# Patient Record
Sex: FEMALE | Race: WHITE | NOT HISPANIC OR LATINO | Employment: OTHER | URBAN - NONMETROPOLITAN AREA
[De-identification: names, ages, dates, MRNs, and addresses within clinical notes are randomized per-mention and may not be internally consistent; named-entity substitution may affect disease eponyms.]

---

## 2021-03-11 ENCOUNTER — HOSPITAL ENCOUNTER (OUTPATIENT)
Dept: LAB | Facility: MEDICAL CENTER | Age: 81
End: 2021-03-11
Attending: INTERNAL MEDICINE
Payer: MEDICARE

## 2021-03-11 PROCEDURE — 82306 VITAMIN D 25 HYDROXY: CPT

## 2021-03-11 PROCEDURE — 84443 ASSAY THYROID STIM HORMONE: CPT

## 2021-03-11 PROCEDURE — 84439 ASSAY OF FREE THYROXINE: CPT

## 2021-03-11 PROCEDURE — 85025 COMPLETE CBC W/AUTO DIFF WBC: CPT

## 2021-03-11 PROCEDURE — 80053 COMPREHEN METABOLIC PANEL: CPT

## 2021-03-11 PROCEDURE — 82043 UR ALBUMIN QUANTITATIVE: CPT

## 2021-03-11 PROCEDURE — 36415 COLL VENOUS BLD VENIPUNCTURE: CPT

## 2021-03-11 PROCEDURE — 82570 ASSAY OF URINE CREATININE: CPT

## 2021-03-11 PROCEDURE — 80061 LIPID PANEL: CPT

## 2021-03-12 LAB
25(OH)D3 SERPL-MCNC: 77 NG/ML (ref 30–100)
ALBUMIN SERPL BCP-MCNC: 4 G/DL (ref 3.2–4.9)
ALBUMIN/GLOB SERPL: 1.4 G/DL
ALP SERPL-CCNC: 124 U/L (ref 30–99)
ALT SERPL-CCNC: 10 U/L (ref 2–50)
ANION GAP SERPL CALC-SCNC: 10 MMOL/L (ref 7–16)
ANISOCYTOSIS BLD QL SMEAR: ABNORMAL
AST SERPL-CCNC: 19 U/L (ref 12–45)
BASOPHILS # BLD AUTO: 1 % (ref 0–1.8)
BASOPHILS # BLD: 0.07 K/UL (ref 0–0.12)
BILIRUB SERPL-MCNC: 0.4 MG/DL (ref 0.1–1.5)
BUN SERPL-MCNC: 13 MG/DL (ref 8–22)
CALCIUM SERPL-MCNC: 9.3 MG/DL (ref 8.5–10.5)
CHLORIDE SERPL-SCNC: 103 MMOL/L (ref 96–112)
CHOLEST SERPL-MCNC: 158 MG/DL (ref 100–199)
CO2 SERPL-SCNC: 26 MMOL/L (ref 20–33)
COMMENT 1642: NORMAL
CREAT SERPL-MCNC: 0.77 MG/DL (ref 0.5–1.4)
CREAT UR-MCNC: 83.9 MG/DL
EOSINOPHIL # BLD AUTO: 0.14 K/UL (ref 0–0.51)
EOSINOPHIL NFR BLD: 1.9 % (ref 0–6.9)
ERYTHROCYTE [DISTWIDTH] IN BLOOD BY AUTOMATED COUNT: 54.3 FL (ref 35.9–50)
FASTING STATUS PATIENT QL REPORTED: NORMAL
GLOBULIN SER CALC-MCNC: 2.9 G/DL (ref 1.9–3.5)
GLUCOSE SERPL-MCNC: 125 MG/DL (ref 65–99)
HCT VFR BLD AUTO: 35.2 % (ref 37–47)
HDLC SERPL-MCNC: 61 MG/DL
HGB BLD-MCNC: 10.5 G/DL (ref 12–16)
IMM GRANULOCYTES # BLD AUTO: 0.04 K/UL (ref 0–0.11)
IMM GRANULOCYTES NFR BLD AUTO: 0.5 % (ref 0–0.9)
LDLC SERPL CALC-MCNC: 66 MG/DL
LYMPHOCYTES # BLD AUTO: 1.26 K/UL (ref 1–4.8)
LYMPHOCYTES NFR BLD: 17.2 % (ref 22–41)
MCH RBC QN AUTO: 24.1 PG (ref 27–33)
MCHC RBC AUTO-ENTMCNC: 29.8 G/DL (ref 33.6–35)
MCV RBC AUTO: 80.7 FL (ref 81.4–97.8)
MICROALBUMIN UR-MCNC: <1.2 MG/DL
MICROALBUMIN/CREAT UR: NORMAL MG/G (ref 0–30)
MICROCYTES BLD QL SMEAR: ABNORMAL
MONOCYTES # BLD AUTO: 0.58 K/UL (ref 0–0.85)
MONOCYTES NFR BLD AUTO: 7.9 % (ref 0–13.4)
MORPHOLOGY BLD-IMP: NORMAL
NEUTROPHILS # BLD AUTO: 5.22 K/UL (ref 2–7.15)
NEUTROPHILS NFR BLD: 71.5 % (ref 44–72)
NRBC # BLD AUTO: 0 K/UL
NRBC BLD-RTO: 0 /100 WBC
OVALOCYTES BLD QL SMEAR: NORMAL
PLATELET # BLD AUTO: 287 K/UL (ref 164–446)
PLATELET BLD QL SMEAR: NORMAL
PMV BLD AUTO: 10.8 FL (ref 9–12.9)
POIKILOCYTOSIS BLD QL SMEAR: NORMAL
POLYCHROMASIA BLD QL SMEAR: NORMAL
POTASSIUM SERPL-SCNC: 4.1 MMOL/L (ref 3.6–5.5)
PROT SERPL-MCNC: 6.9 G/DL (ref 6–8.2)
RBC # BLD AUTO: 4.36 M/UL (ref 4.2–5.4)
RBC BLD AUTO: PRESENT
SODIUM SERPL-SCNC: 139 MMOL/L (ref 135–145)
T4 FREE SERPL-MCNC: 0.91 NG/DL (ref 0.93–1.7)
TRIGL SERPL-MCNC: 157 MG/DL (ref 0–149)
TSH SERPL DL<=0.005 MIU/L-ACNC: 2.16 UIU/ML (ref 0.38–5.33)
WBC # BLD AUTO: 7.3 K/UL (ref 4.8–10.8)

## 2025-06-08 ENCOUNTER — HOSPITAL ENCOUNTER (INPATIENT)
Facility: MEDICAL CENTER | Age: 85
LOS: 3 days | DRG: 482 | End: 2025-06-11
Attending: EMERGENCY MEDICINE | Admitting: STUDENT IN AN ORGANIZED HEALTH CARE EDUCATION/TRAINING PROGRAM
Payer: MEDICARE

## 2025-06-08 ENCOUNTER — APPOINTMENT (OUTPATIENT)
Dept: RADIOLOGY | Facility: MEDICAL CENTER | Age: 85
DRG: 482 | End: 2025-06-08
Attending: EMERGENCY MEDICINE
Payer: MEDICARE

## 2025-06-08 ENCOUNTER — APPOINTMENT (OUTPATIENT)
Dept: RADIOLOGY | Facility: MEDICAL CENTER | Age: 85
DRG: 482 | End: 2025-06-08
Attending: ORTHOPAEDIC SURGERY
Payer: MEDICARE

## 2025-06-08 DIAGNOSIS — Z87.81 HISTORY OF FEMUR FRACTURE: ICD-10-CM

## 2025-06-08 DIAGNOSIS — S72.002A CLOSED FRACTURE OF LEFT HIP, INITIAL ENCOUNTER (HCC): Primary | ICD-10-CM

## 2025-06-08 DIAGNOSIS — Z01.818 ENCOUNTER FOR PREOPERATIVE ASSESSMENT: ICD-10-CM

## 2025-06-08 PROBLEM — E78.5 HYPERLIPIDEMIA: Status: ACTIVE | Noted: 2025-06-08

## 2025-06-08 PROBLEM — S72.90XA FEMUR FRACTURE (HCC): Status: ACTIVE | Noted: 2025-06-08

## 2025-06-08 PROBLEM — I10 HYPERTENSION: Status: ACTIVE | Noted: 2025-06-08

## 2025-06-08 PROBLEM — Z71.89 ACP (ADVANCE CARE PLANNING): Status: ACTIVE | Noted: 2025-06-08

## 2025-06-08 LAB
ALBUMIN SERPL BCP-MCNC: 3.9 G/DL (ref 3.2–4.9)
ALBUMIN/GLOB SERPL: 1.4 G/DL
ALP SERPL-CCNC: 144 U/L (ref 30–99)
ALT SERPL-CCNC: 16 U/L (ref 2–50)
ANION GAP SERPL CALC-SCNC: 10 MMOL/L (ref 7–16)
AST SERPL-CCNC: 26 U/L (ref 12–45)
BASOPHILS # BLD AUTO: 0.6 % (ref 0–1.8)
BASOPHILS # BLD: 0.07 K/UL (ref 0–0.12)
BILIRUB SERPL-MCNC: 0.3 MG/DL (ref 0.1–1.5)
BUN SERPL-MCNC: 17 MG/DL (ref 8–22)
CALCIUM ALBUM COR SERPL-MCNC: 9.2 MG/DL (ref 8.5–10.5)
CALCIUM SERPL-MCNC: 9.1 MG/DL (ref 8.5–10.5)
CHLORIDE SERPL-SCNC: 107 MMOL/L (ref 96–112)
CO2 SERPL-SCNC: 22 MMOL/L (ref 20–33)
CREAT SERPL-MCNC: 0.9 MG/DL (ref 0.5–1.4)
EKG IMPRESSION: NORMAL
EOSINOPHIL # BLD AUTO: 0.1 K/UL (ref 0–0.51)
EOSINOPHIL NFR BLD: 0.8 % (ref 0–6.9)
ERYTHROCYTE [DISTWIDTH] IN BLOOD BY AUTOMATED COUNT: 45.3 FL (ref 35.9–50)
GFR SERPLBLD CREATININE-BSD FMLA CKD-EPI: 63 ML/MIN/1.73 M 2
GLOBULIN SER CALC-MCNC: 2.8 G/DL (ref 1.9–3.5)
GLUCOSE SERPL-MCNC: 110 MG/DL (ref 65–99)
HCT VFR BLD AUTO: 35.8 % (ref 37–47)
HGB BLD-MCNC: 11.3 G/DL (ref 12–16)
IMM GRANULOCYTES # BLD AUTO: 0.05 K/UL (ref 0–0.11)
IMM GRANULOCYTES NFR BLD AUTO: 0.4 % (ref 0–0.9)
LYMPHOCYTES # BLD AUTO: 0.91 K/UL (ref 1–4.8)
LYMPHOCYTES NFR BLD: 7.6 % (ref 22–41)
MCH RBC QN AUTO: 28.2 PG (ref 27–33)
MCHC RBC AUTO-ENTMCNC: 31.6 G/DL (ref 32.2–35.5)
MCV RBC AUTO: 89.3 FL (ref 81.4–97.8)
MONOCYTES # BLD AUTO: 0.64 K/UL (ref 0–0.85)
MONOCYTES NFR BLD AUTO: 5.3 % (ref 0–13.4)
NEUTROPHILS # BLD AUTO: 10.27 K/UL (ref 1.82–7.42)
NEUTROPHILS NFR BLD: 85.3 % (ref 44–72)
NRBC # BLD AUTO: 0 K/UL
NRBC BLD-RTO: 0 /100 WBC (ref 0–0.2)
PLATELET # BLD AUTO: 203 K/UL (ref 164–446)
PMV BLD AUTO: 10.3 FL (ref 9–12.9)
POTASSIUM SERPL-SCNC: 4.1 MMOL/L (ref 3.6–5.5)
PROT SERPL-MCNC: 6.7 G/DL (ref 6–8.2)
RBC # BLD AUTO: 4.01 M/UL (ref 4.2–5.4)
SODIUM SERPL-SCNC: 139 MMOL/L (ref 135–145)
WBC # BLD AUTO: 12 K/UL (ref 4.8–10.8)

## 2025-06-08 PROCEDURE — 99285 EMERGENCY DEPT VISIT HI MDM: CPT

## 2025-06-08 PROCEDURE — 73502 X-RAY EXAM HIP UNI 2-3 VIEWS: CPT | Mod: LT

## 2025-06-08 PROCEDURE — 80053 COMPREHEN METABOLIC PANEL: CPT

## 2025-06-08 PROCEDURE — 73552 X-RAY EXAM OF FEMUR 2/>: CPT | Mod: LT

## 2025-06-08 PROCEDURE — 93005 ELECTROCARDIOGRAM TRACING: CPT | Mod: TC | Performed by: STUDENT IN AN ORGANIZED HEALTH CARE EDUCATION/TRAINING PROGRAM

## 2025-06-08 PROCEDURE — 36415 COLL VENOUS BLD VENIPUNCTURE: CPT

## 2025-06-08 PROCEDURE — 99223 1ST HOSP IP/OBS HIGH 75: CPT | Mod: AI | Performed by: STUDENT IN AN ORGANIZED HEALTH CARE EDUCATION/TRAINING PROGRAM

## 2025-06-08 PROCEDURE — 700102 HCHG RX REV CODE 250 W/ 637 OVERRIDE(OP): Performed by: STUDENT IN AN ORGANIZED HEALTH CARE EDUCATION/TRAINING PROGRAM

## 2025-06-08 PROCEDURE — 64447 NJX AA&/STRD FEMORAL NRV IMG: CPT

## 2025-06-08 PROCEDURE — 770001 HCHG ROOM/CARE - MED/SURG/GYN PRIV*

## 2025-06-08 PROCEDURE — A9270 NON-COVERED ITEM OR SERVICE: HCPCS | Performed by: STUDENT IN AN ORGANIZED HEALTH CARE EDUCATION/TRAINING PROGRAM

## 2025-06-08 PROCEDURE — 71045 X-RAY EXAM CHEST 1 VIEW: CPT

## 2025-06-08 PROCEDURE — 96374 THER/PROPH/DIAG INJ IV PUSH: CPT

## 2025-06-08 PROCEDURE — 99497 ADVNCD CARE PLAN 30 MIN: CPT | Mod: 25 | Performed by: STUDENT IN AN ORGANIZED HEALTH CARE EDUCATION/TRAINING PROGRAM

## 2025-06-08 PROCEDURE — 700111 HCHG RX REV CODE 636 W/ 250 OVERRIDE (IP): Performed by: EMERGENCY MEDICINE

## 2025-06-08 PROCEDURE — 700105 HCHG RX REV CODE 258: Performed by: STUDENT IN AN ORGANIZED HEALTH CARE EDUCATION/TRAINING PROGRAM

## 2025-06-08 PROCEDURE — 85025 COMPLETE CBC W/AUTO DIFF WBC: CPT

## 2025-06-08 RX ORDER — DEXTROSE MONOHYDRATE 25 G/50ML
25 INJECTION, SOLUTION INTRAVENOUS
Status: DISCONTINUED | OUTPATIENT
Start: 2025-06-08 | End: 2025-06-09

## 2025-06-08 RX ORDER — ONDANSETRON 4 MG/1
4 TABLET, ORALLY DISINTEGRATING ORAL EVERY 4 HOURS PRN
Status: DISCONTINUED | OUTPATIENT
Start: 2025-06-08 | End: 2025-06-11 | Stop reason: HOSPADM

## 2025-06-08 RX ORDER — AMLODIPINE BESYLATE 5 MG/1
5 TABLET ORAL EVERY EVENING
Status: DISCONTINUED | OUTPATIENT
Start: 2025-06-08 | End: 2025-06-11 | Stop reason: HOSPADM

## 2025-06-08 RX ORDER — AMLODIPINE BESYLATE 5 MG/1
5 TABLET ORAL EVERY EVENING
COMMUNITY

## 2025-06-08 RX ORDER — MELOXICAM 15 MG/1
15 TABLET ORAL EVERY EVENING
Status: ON HOLD | COMMUNITY
End: 2025-06-11

## 2025-06-08 RX ORDER — SODIUM CHLORIDE 9 MG/ML
INJECTION, SOLUTION INTRAVENOUS CONTINUOUS
Status: DISCONTINUED | OUTPATIENT
Start: 2025-06-09 | End: 2025-06-10

## 2025-06-08 RX ORDER — OXYCODONE HYDROCHLORIDE 5 MG/1
2.5 TABLET ORAL
Refills: 0 | Status: DISCONTINUED | OUTPATIENT
Start: 2025-06-08 | End: 2025-06-11 | Stop reason: HOSPADM

## 2025-06-08 RX ORDER — MORPHINE SULFATE 4 MG/ML
4 INJECTION INTRAVENOUS ONCE
Status: ACTIVE | OUTPATIENT
Start: 2025-06-08 | End: 2025-06-09

## 2025-06-08 RX ORDER — HYDROMORPHONE HYDROCHLORIDE 1 MG/ML
0.25 INJECTION, SOLUTION INTRAMUSCULAR; INTRAVENOUS; SUBCUTANEOUS
Status: DISCONTINUED | OUTPATIENT
Start: 2025-06-08 | End: 2025-06-11 | Stop reason: HOSPADM

## 2025-06-08 RX ORDER — TRAZODONE HYDROCHLORIDE 50 MG/1
50 TABLET ORAL
Status: DISCONTINUED | OUTPATIENT
Start: 2025-06-08 | End: 2025-06-11 | Stop reason: HOSPADM

## 2025-06-08 RX ORDER — LABETALOL HYDROCHLORIDE 5 MG/ML
10 INJECTION, SOLUTION INTRAVENOUS EVERY 4 HOURS PRN
Status: DISCONTINUED | OUTPATIENT
Start: 2025-06-08 | End: 2025-06-11 | Stop reason: HOSPADM

## 2025-06-08 RX ORDER — TRAZODONE HYDROCHLORIDE 50 MG/1
50 TABLET ORAL
COMMUNITY

## 2025-06-08 RX ORDER — EMPAGLIFLOZIN 25 MG/1
25 TABLET, FILM COATED ORAL EVERY EVENING
COMMUNITY

## 2025-06-08 RX ORDER — ACETAMINOPHEN 325 MG/1
650 TABLET ORAL EVERY 6 HOURS PRN
Status: DISCONTINUED | OUTPATIENT
Start: 2025-06-08 | End: 2025-06-11 | Stop reason: HOSPADM

## 2025-06-08 RX ORDER — INSULIN LISPRO 100 [IU]/ML
1-6 INJECTION, SOLUTION INTRAVENOUS; SUBCUTANEOUS EVERY 6 HOURS
Status: DISCONTINUED | OUTPATIENT
Start: 2025-06-09 | End: 2025-06-09

## 2025-06-08 RX ORDER — ONDANSETRON 2 MG/ML
4 INJECTION INTRAMUSCULAR; INTRAVENOUS EVERY 4 HOURS PRN
Status: DISCONTINUED | OUTPATIENT
Start: 2025-06-08 | End: 2025-06-11 | Stop reason: HOSPADM

## 2025-06-08 RX ORDER — ATORVASTATIN CALCIUM 20 MG/1
20 TABLET, FILM COATED ORAL NIGHTLY
Status: DISCONTINUED | OUTPATIENT
Start: 2025-06-08 | End: 2025-06-11 | Stop reason: HOSPADM

## 2025-06-08 RX ORDER — OXYCODONE HYDROCHLORIDE 5 MG/1
5 TABLET ORAL
Refills: 0 | Status: DISCONTINUED | OUTPATIENT
Start: 2025-06-08 | End: 2025-06-11 | Stop reason: HOSPADM

## 2025-06-08 RX ORDER — DULOXETIN HYDROCHLORIDE 30 MG/1
30 CAPSULE, DELAYED RELEASE ORAL EVERY EVENING
Status: DISCONTINUED | OUTPATIENT
Start: 2025-06-08 | End: 2025-06-11 | Stop reason: HOSPADM

## 2025-06-08 RX ORDER — ROPIVACAINE HYDROCHLORIDE 2 MG/ML
40 INJECTION, SOLUTION EPIDURAL; INFILTRATION; PERINEURAL ONCE
Status: COMPLETED | OUTPATIENT
Start: 2025-06-08 | End: 2025-06-08

## 2025-06-08 RX ADMIN — ROPIVACAINE HYDROCHLORIDE 40 ML: 2 INJECTION, SOLUTION EPIDURAL; INFILTRATION at 20:30

## 2025-06-08 RX ADMIN — SODIUM CHLORIDE: 9 INJECTION, SOLUTION INTRAVENOUS at 22:48

## 2025-06-08 RX ADMIN — AMLODIPINE BESYLATE 5 MG: 5 TABLET ORAL at 22:39

## 2025-06-08 RX ADMIN — ATORVASTATIN CALCIUM 20 MG: 20 TABLET, FILM COATED ORAL at 22:40

## 2025-06-08 RX ADMIN — DULOXETINE 30 MG: 30 CAPSULE, DELAYED RELEASE ORAL at 22:39

## 2025-06-08 ASSESSMENT — COGNITIVE AND FUNCTIONAL STATUS - GENERAL
MOVING FROM LYING ON BACK TO SITTING ON SIDE OF FLAT BED: TOTAL
MOBILITY SCORE: 6
SUGGESTED CMS G CODE MODIFIER MOBILITY: CN
SUGGESTED CMS G CODE MODIFIER DAILY ACTIVITY: CK
HELP NEEDED FOR BATHING: TOTAL
MOVING TO AND FROM BED TO CHAIR: TOTAL
DRESSING REGULAR LOWER BODY CLOTHING: TOTAL
TURNING FROM BACK TO SIDE WHILE IN FLAT BAD: TOTAL
DAILY ACTIVITIY SCORE: 15
WALKING IN HOSPITAL ROOM: TOTAL
CLIMB 3 TO 5 STEPS WITH RAILING: TOTAL
TOILETING: TOTAL
STANDING UP FROM CHAIR USING ARMS: TOTAL

## 2025-06-08 ASSESSMENT — ENCOUNTER SYMPTOMS
PSYCHIATRIC NEGATIVE: 1
CARDIOVASCULAR NEGATIVE: 1
NEUROLOGICAL NEGATIVE: 1
GASTROINTESTINAL NEGATIVE: 1
FALLS: 1
RESPIRATORY NEGATIVE: 1
EYES NEGATIVE: 1

## 2025-06-08 ASSESSMENT — PAIN DESCRIPTION - PAIN TYPE: TYPE: ACUTE PAIN

## 2025-06-09 ENCOUNTER — HOSPITAL ENCOUNTER (OUTPATIENT)
Dept: RADIOLOGY | Facility: MEDICAL CENTER | Age: 85
End: 2025-06-09
Attending: EMERGENCY MEDICINE
Payer: MEDICARE

## 2025-06-09 ENCOUNTER — APPOINTMENT (OUTPATIENT)
Dept: RADIOLOGY | Facility: MEDICAL CENTER | Age: 85
DRG: 482 | End: 2025-06-09
Attending: ORTHOPAEDIC SURGERY
Payer: MEDICARE

## 2025-06-09 ENCOUNTER — ANESTHESIA EVENT (OUTPATIENT)
Dept: SURGERY | Facility: MEDICAL CENTER | Age: 85
DRG: 482 | End: 2025-06-09
Payer: MEDICARE

## 2025-06-09 ENCOUNTER — SURGERY (OUTPATIENT)
Age: 85
End: 2025-06-09
Payer: MEDICARE

## 2025-06-09 ENCOUNTER — ANESTHESIA (OUTPATIENT)
Dept: SURGERY | Facility: MEDICAL CENTER | Age: 85
DRG: 482 | End: 2025-06-09
Payer: MEDICARE

## 2025-06-09 LAB
ANION GAP SERPL CALC-SCNC: 12 MMOL/L (ref 7–16)
BUN SERPL-MCNC: 15 MG/DL (ref 8–22)
CALCIUM SERPL-MCNC: 8.8 MG/DL (ref 8.5–10.5)
CHLORIDE SERPL-SCNC: 106 MMOL/L (ref 96–112)
CO2 SERPL-SCNC: 21 MMOL/L (ref 20–33)
CREAT SERPL-MCNC: 0.86 MG/DL (ref 0.5–1.4)
ERYTHROCYTE [DISTWIDTH] IN BLOOD BY AUTOMATED COUNT: 45.1 FL (ref 35.9–50)
GFR SERPLBLD CREATININE-BSD FMLA CKD-EPI: 67 ML/MIN/1.73 M 2
GLUCOSE BLD STRIP.AUTO-MCNC: 131 MG/DL (ref 65–99)
GLUCOSE BLD STRIP.AUTO-MCNC: 151 MG/DL (ref 65–99)
GLUCOSE BLD STRIP.AUTO-MCNC: 170 MG/DL (ref 65–99)
GLUCOSE BLD STRIP.AUTO-MCNC: 188 MG/DL (ref 65–99)
GLUCOSE BLD STRIP.AUTO-MCNC: 199 MG/DL (ref 65–99)
GLUCOSE SERPL-MCNC: 159 MG/DL (ref 65–99)
HCT VFR BLD AUTO: 35 % (ref 37–47)
HGB BLD-MCNC: 11.2 G/DL (ref 12–16)
MCH RBC QN AUTO: 28.4 PG (ref 27–33)
MCHC RBC AUTO-ENTMCNC: 32 G/DL (ref 32.2–35.5)
MCV RBC AUTO: 88.6 FL (ref 81.4–97.8)
PLATELET # BLD AUTO: 187 K/UL (ref 164–446)
PMV BLD AUTO: 10.8 FL (ref 9–12.9)
POTASSIUM SERPL-SCNC: 3.7 MMOL/L (ref 3.6–5.5)
RBC # BLD AUTO: 3.95 M/UL (ref 4.2–5.4)
SODIUM SERPL-SCNC: 139 MMOL/L (ref 135–145)
WBC # BLD AUTO: 10.2 K/UL (ref 4.8–10.8)

## 2025-06-09 PROCEDURE — 85027 COMPLETE CBC AUTOMATED: CPT

## 2025-06-09 PROCEDURE — 80048 BASIC METABOLIC PNL TOTAL CA: CPT

## 2025-06-09 PROCEDURE — 700111 HCHG RX REV CODE 636 W/ 250 OVERRIDE (IP): Performed by: STUDENT IN AN ORGANIZED HEALTH CARE EDUCATION/TRAINING PROGRAM

## 2025-06-09 PROCEDURE — 73502 X-RAY EXAM HIP UNI 2-3 VIEWS: CPT | Mod: LT

## 2025-06-09 PROCEDURE — 160015 HCHG STAT PREOP MINUTES: Performed by: ORTHOPAEDIC SURGERY

## 2025-06-09 PROCEDURE — C1713 ANCHOR/SCREW BN/BN,TIS/BN: HCPCS | Performed by: ORTHOPAEDIC SURGERY

## 2025-06-09 PROCEDURE — A9270 NON-COVERED ITEM OR SERVICE: HCPCS | Performed by: STUDENT IN AN ORGANIZED HEALTH CARE EDUCATION/TRAINING PROGRAM

## 2025-06-09 PROCEDURE — 99232 SBSQ HOSP IP/OBS MODERATE 35: CPT | Performed by: STUDENT IN AN ORGANIZED HEALTH CARE EDUCATION/TRAINING PROGRAM

## 2025-06-09 PROCEDURE — 160048 HCHG OR STATISTICAL LEVEL 1-5: Performed by: ORTHOPAEDIC SURGERY

## 2025-06-09 PROCEDURE — 770001 HCHG ROOM/CARE - MED/SURG/GYN PRIV*

## 2025-06-09 PROCEDURE — 700111 HCHG RX REV CODE 636 W/ 250 OVERRIDE (IP): Mod: JZ | Performed by: STUDENT IN AN ORGANIZED HEALTH CARE EDUCATION/TRAINING PROGRAM

## 2025-06-09 PROCEDURE — 27245 TREAT THIGH FRACTURE: CPT | Mod: 80ROC,LT | Performed by: STUDENT IN AN ORGANIZED HEALTH CARE EDUCATION/TRAINING PROGRAM

## 2025-06-09 PROCEDURE — 700102 HCHG RX REV CODE 250 W/ 637 OVERRIDE(OP): Performed by: STUDENT IN AN ORGANIZED HEALTH CARE EDUCATION/TRAINING PROGRAM

## 2025-06-09 PROCEDURE — 0QS704Z REPOSITION LEFT UPPER FEMUR WITH INTERNAL FIXATION DEVICE, OPEN APPROACH: ICD-10-PCS | Performed by: ORTHOPAEDIC SURGERY

## 2025-06-09 PROCEDURE — 160029 HCHG SURGERY MINUTES - 1ST 30 MINS LEVEL 4: Performed by: ORTHOPAEDIC SURGERY

## 2025-06-09 PROCEDURE — 73700 CT LOWER EXTREMITY W/O DYE: CPT | Mod: LT

## 2025-06-09 PROCEDURE — 160035 HCHG PACU - 1ST 60 MINS PHASE I: Performed by: ORTHOPAEDIC SURGERY

## 2025-06-09 PROCEDURE — 700111 HCHG RX REV CODE 636 W/ 250 OVERRIDE (IP): Mod: JZ,TB | Performed by: ORTHOPAEDIC SURGERY

## 2025-06-09 PROCEDURE — 700101 HCHG RX REV CODE 250: Performed by: STUDENT IN AN ORGANIZED HEALTH CARE EDUCATION/TRAINING PROGRAM

## 2025-06-09 PROCEDURE — 99223 1ST HOSP IP/OBS HIGH 75: CPT | Mod: 57 | Performed by: ORTHOPAEDIC SURGERY

## 2025-06-09 PROCEDURE — 160002 HCHG RECOVERY MINUTES (STAT): Performed by: ORTHOPAEDIC SURGERY

## 2025-06-09 PROCEDURE — 160041 HCHG SURGERY MINUTES - EA ADDL 1 MIN LEVEL 4: Performed by: ORTHOPAEDIC SURGERY

## 2025-06-09 PROCEDURE — 160009 HCHG ANES TIME/MIN: Performed by: ORTHOPAEDIC SURGERY

## 2025-06-09 PROCEDURE — 82962 GLUCOSE BLOOD TEST: CPT | Mod: 91

## 2025-06-09 PROCEDURE — 700105 HCHG RX REV CODE 258: Performed by: STUDENT IN AN ORGANIZED HEALTH CARE EDUCATION/TRAINING PROGRAM

## 2025-06-09 PROCEDURE — 700105 HCHG RX REV CODE 258: Performed by: ORTHOPAEDIC SURGERY

## 2025-06-09 PROCEDURE — 110371 HCHG SHELL REV 272: Performed by: ORTHOPAEDIC SURGERY

## 2025-06-09 PROCEDURE — 27245 TREAT THIGH FRACTURE: CPT | Mod: LT | Performed by: ORTHOPAEDIC SURGERY

## 2025-06-09 PROCEDURE — 36415 COLL VENOUS BLD VENIPUNCTURE: CPT

## 2025-06-09 DEVICE — LOCKING SCREW DIA 5X35MM: Type: IMPLANTABLE DEVICE | Site: FEMUR | Status: FUNCTIONAL

## 2025-06-09 DEVICE — NAIL THOCHANTERIC 125DEG 10MM X 170MM (1EA): Type: IMPLANTABLE DEVICE | Site: FEMUR | Status: FUNCTIONAL

## 2025-06-09 DEVICE — IMPLANTABLE DEVICE: Type: IMPLANTABLE DEVICE | Site: FEMUR | Status: FUNCTIONAL

## 2025-06-09 DEVICE — K-WIRE 3MM X 285MM (1EA): Type: IMPLANTABLE DEVICE | Site: FEMUR | Status: FUNCTIONAL

## 2025-06-09 DEVICE — PIN PRECISION TM TAPER D3.2/3.9 X 450MM (1EA): Type: IMPLANTABLE DEVICE | Site: FEMUR | Status: FUNCTIONAL

## 2025-06-09 RX ORDER — ONDANSETRON 2 MG/ML
4 INJECTION INTRAMUSCULAR; INTRAVENOUS
Status: DISCONTINUED | OUTPATIENT
Start: 2025-06-09 | End: 2025-06-09 | Stop reason: HOSPADM

## 2025-06-09 RX ORDER — CEFAZOLIN SODIUM 1 G/3ML
INJECTION, POWDER, FOR SOLUTION INTRAMUSCULAR; INTRAVENOUS PRN
Status: DISCONTINUED | OUTPATIENT
Start: 2025-06-09 | End: 2025-06-09 | Stop reason: SURG

## 2025-06-09 RX ORDER — OXYCODONE HCL 5 MG/5 ML
5 SOLUTION, ORAL ORAL
Status: COMPLETED | OUTPATIENT
Start: 2025-06-09 | End: 2025-06-09

## 2025-06-09 RX ORDER — LIDOCAINE HYDROCHLORIDE 20 MG/ML
INJECTION, SOLUTION EPIDURAL; INFILTRATION; INTRACAUDAL; PERINEURAL PRN
Status: DISCONTINUED | OUTPATIENT
Start: 2025-06-09 | End: 2025-06-09 | Stop reason: SURG

## 2025-06-09 RX ORDER — HYDROMORPHONE HYDROCHLORIDE 1 MG/ML
0.2 INJECTION, SOLUTION INTRAMUSCULAR; INTRAVENOUS; SUBCUTANEOUS
Status: DISCONTINUED | OUTPATIENT
Start: 2025-06-09 | End: 2025-06-09 | Stop reason: HOSPADM

## 2025-06-09 RX ORDER — SODIUM CHLORIDE, SODIUM LACTATE, POTASSIUM CHLORIDE, CALCIUM CHLORIDE 600; 310; 30; 20 MG/100ML; MG/100ML; MG/100ML; MG/100ML
INJECTION, SOLUTION INTRAVENOUS CONTINUOUS
Status: DISCONTINUED | OUTPATIENT
Start: 2025-06-09 | End: 2025-06-09 | Stop reason: HOSPADM

## 2025-06-09 RX ORDER — SODIUM CHLORIDE, SODIUM LACTATE, POTASSIUM CHLORIDE, CALCIUM CHLORIDE 600; 310; 30; 20 MG/100ML; MG/100ML; MG/100ML; MG/100ML
INJECTION, SOLUTION INTRAVENOUS
Status: DISCONTINUED | OUTPATIENT
Start: 2025-06-09 | End: 2025-06-09 | Stop reason: SURG

## 2025-06-09 RX ORDER — ALBUTEROL SULFATE 5 MG/ML
2.5 SOLUTION RESPIRATORY (INHALATION)
Status: DISCONTINUED | OUTPATIENT
Start: 2025-06-09 | End: 2025-06-09 | Stop reason: HOSPADM

## 2025-06-09 RX ORDER — EPHEDRINE SULFATE 50 MG/ML
5 INJECTION, SOLUTION INTRAVENOUS
Status: DISCONTINUED | OUTPATIENT
Start: 2025-06-09 | End: 2025-06-09 | Stop reason: HOSPADM

## 2025-06-09 RX ORDER — METOPROLOL TARTRATE 1 MG/ML
1 INJECTION, SOLUTION INTRAVENOUS
Status: DISCONTINUED | OUTPATIENT
Start: 2025-06-09 | End: 2025-06-09 | Stop reason: HOSPADM

## 2025-06-09 RX ORDER — ONDANSETRON 2 MG/ML
INJECTION INTRAMUSCULAR; INTRAVENOUS PRN
Status: DISCONTINUED | OUTPATIENT
Start: 2025-06-09 | End: 2025-06-09 | Stop reason: SURG

## 2025-06-09 RX ORDER — HYDRALAZINE HYDROCHLORIDE 20 MG/ML
5 INJECTION INTRAMUSCULAR; INTRAVENOUS
Status: DISCONTINUED | OUTPATIENT
Start: 2025-06-09 | End: 2025-06-09 | Stop reason: HOSPADM

## 2025-06-09 RX ORDER — HYDROMORPHONE HYDROCHLORIDE 1 MG/ML
0.4 INJECTION, SOLUTION INTRAMUSCULAR; INTRAVENOUS; SUBCUTANEOUS
Status: DISCONTINUED | OUTPATIENT
Start: 2025-06-09 | End: 2025-06-09 | Stop reason: HOSPADM

## 2025-06-09 RX ORDER — DEXTROSE MONOHYDRATE 25 G/50ML
25 INJECTION, SOLUTION INTRAVENOUS
Status: DISCONTINUED | OUTPATIENT
Start: 2025-06-09 | End: 2025-06-11 | Stop reason: HOSPADM

## 2025-06-09 RX ORDER — LISINOPRIL 5 MG/1
5 TABLET ORAL DAILY
COMMUNITY

## 2025-06-09 RX ORDER — DIPHENHYDRAMINE HYDROCHLORIDE 50 MG/ML
12.5 INJECTION, SOLUTION INTRAMUSCULAR; INTRAVENOUS
Status: DISCONTINUED | OUTPATIENT
Start: 2025-06-09 | End: 2025-06-09 | Stop reason: HOSPADM

## 2025-06-09 RX ORDER — OMEPRAZOLE 20 MG/1
20 CAPSULE, DELAYED RELEASE ORAL EVERY EVENING
Status: DISCONTINUED | OUTPATIENT
Start: 2025-06-09 | End: 2025-06-11 | Stop reason: HOSPADM

## 2025-06-09 RX ORDER — KETOROLAC TROMETHAMINE 15 MG/ML
INJECTION, SOLUTION INTRAMUSCULAR; INTRAVENOUS PRN
Status: DISCONTINUED | OUTPATIENT
Start: 2025-06-09 | End: 2025-06-09 | Stop reason: SURG

## 2025-06-09 RX ORDER — INSULIN LISPRO 100 [IU]/ML
2-9 INJECTION, SOLUTION INTRAVENOUS; SUBCUTANEOUS EVERY 6 HOURS
Status: DISCONTINUED | OUTPATIENT
Start: 2025-06-09 | End: 2025-06-09 | Stop reason: HOSPADM

## 2025-06-09 RX ORDER — OXYCODONE HCL 5 MG/5 ML
10 SOLUTION, ORAL ORAL
Status: COMPLETED | OUTPATIENT
Start: 2025-06-09 | End: 2025-06-09

## 2025-06-09 RX ORDER — DEXTROSE MONOHYDRATE 25 G/50ML
25 INJECTION, SOLUTION INTRAVENOUS
Status: DISCONTINUED | OUTPATIENT
Start: 2025-06-09 | End: 2025-06-09 | Stop reason: HOSPADM

## 2025-06-09 RX ORDER — LABETALOL HYDROCHLORIDE 5 MG/ML
5 INJECTION, SOLUTION INTRAVENOUS
Status: DISCONTINUED | OUTPATIENT
Start: 2025-06-09 | End: 2025-06-09 | Stop reason: HOSPADM

## 2025-06-09 RX ORDER — HALOPERIDOL 5 MG/ML
1 INJECTION INTRAMUSCULAR
Status: DISCONTINUED | OUTPATIENT
Start: 2025-06-09 | End: 2025-06-09 | Stop reason: HOSPADM

## 2025-06-09 RX ORDER — MAGNESIUM SULFATE HEPTAHYDRATE 40 MG/ML
INJECTION, SOLUTION INTRAVENOUS PRN
Status: DISCONTINUED | OUTPATIENT
Start: 2025-06-09 | End: 2025-06-09 | Stop reason: SURG

## 2025-06-09 RX ORDER — HYDROMORPHONE HYDROCHLORIDE 1 MG/ML
0.1 INJECTION, SOLUTION INTRAMUSCULAR; INTRAVENOUS; SUBCUTANEOUS
Status: DISCONTINUED | OUTPATIENT
Start: 2025-06-09 | End: 2025-06-09 | Stop reason: HOSPADM

## 2025-06-09 RX ORDER — INSULIN LISPRO 100 [IU]/ML
1-6 INJECTION, SOLUTION INTRAVENOUS; SUBCUTANEOUS
Status: DISCONTINUED | OUTPATIENT
Start: 2025-06-09 | End: 2025-06-10

## 2025-06-09 RX ORDER — DEXAMETHASONE SODIUM PHOSPHATE 4 MG/ML
INJECTION, SOLUTION INTRA-ARTICULAR; INTRALESIONAL; INTRAMUSCULAR; INTRAVENOUS; SOFT TISSUE PRN
Status: DISCONTINUED | OUTPATIENT
Start: 2025-06-09 | End: 2025-06-09 | Stop reason: SURG

## 2025-06-09 RX ADMIN — OXYCODONE HYDROCHLORIDE 10 MG: 5 SOLUTION ORAL at 15:12

## 2025-06-09 RX ADMIN — SODIUM CHLORIDE, POTASSIUM CHLORIDE, SODIUM LACTATE AND CALCIUM CHLORIDE: 600; 310; 30; 20 INJECTION, SOLUTION INTRAVENOUS at 13:48

## 2025-06-09 RX ADMIN — DULOXETINE 30 MG: 30 CAPSULE, DELAYED RELEASE ORAL at 17:41

## 2025-06-09 RX ADMIN — DEXAMETHASONE SODIUM PHOSPHATE 4 MG: 4 INJECTION INTRA-ARTICULAR; INTRALESIONAL; INTRAMUSCULAR; INTRAVENOUS; SOFT TISSUE at 14:16

## 2025-06-09 RX ADMIN — INSULIN LISPRO 1 UNITS: 100 INJECTION, SOLUTION INTRAVENOUS; SUBCUTANEOUS at 05:41

## 2025-06-09 RX ADMIN — PROPOFOL 140 MG: 10 INJECTION, EMULSION INTRAVENOUS at 14:11

## 2025-06-09 RX ADMIN — MAGNESIUM SULFATE HEPTAHYDRATE 2 G: 2 INJECTION, SOLUTION INTRAVENOUS at 14:35

## 2025-06-09 RX ADMIN — FENTANYL CITRATE 50 MCG: 50 INJECTION, SOLUTION INTRAMUSCULAR; INTRAVENOUS at 14:42

## 2025-06-09 RX ADMIN — INSULIN LISPRO 1 UNITS: 100 INJECTION, SOLUTION INTRAVENOUS; SUBCUTANEOUS at 17:45

## 2025-06-09 RX ADMIN — ATORVASTATIN CALCIUM 20 MG: 20 TABLET, FILM COATED ORAL at 20:10

## 2025-06-09 RX ADMIN — LIDOCAINE HYDROCHLORIDE 100 MG: 20 INJECTION, SOLUTION EPIDURAL; INFILTRATION; INTRACAUDAL; PERINEURAL at 14:11

## 2025-06-09 RX ADMIN — AMLODIPINE BESYLATE 5 MG: 5 TABLET ORAL at 17:41

## 2025-06-09 RX ADMIN — OXYCODONE 2.5 MG: 5 TABLET ORAL at 00:30

## 2025-06-09 RX ADMIN — INSULIN LISPRO 1 UNITS: 100 INJECTION, SOLUTION INTRAVENOUS; SUBCUTANEOUS at 20:12

## 2025-06-09 RX ADMIN — LABETALOL HYDROCHLORIDE 10 MG: 5 INJECTION, SOLUTION INTRAVENOUS at 00:24

## 2025-06-09 RX ADMIN — TRAZODONE HYDROCHLORIDE 50 MG: 50 TABLET ORAL at 00:13

## 2025-06-09 RX ADMIN — CEFAZOLIN 2 G: 2 INJECTION, POWDER, FOR SOLUTION INTRAVENOUS at 21:59

## 2025-06-09 RX ADMIN — OXYCODONE 5 MG: 5 TABLET ORAL at 18:57

## 2025-06-09 RX ADMIN — CEFAZOLIN 2 G: 1 INJECTION, POWDER, FOR SOLUTION INTRAMUSCULAR; INTRAVENOUS at 14:16

## 2025-06-09 RX ADMIN — TRAZODONE HYDROCHLORIDE 50 MG: 50 TABLET ORAL at 23:58

## 2025-06-09 RX ADMIN — INSULIN LISPRO 1 UNITS: 100 INJECTION, SOLUTION INTRAVENOUS; SUBCUTANEOUS at 00:22

## 2025-06-09 RX ADMIN — FENTANYL CITRATE 50 MCG: 50 INJECTION, SOLUTION INTRAMUSCULAR; INTRAVENOUS at 15:17

## 2025-06-09 RX ADMIN — FENTANYL CITRATE 50 MCG: 50 INJECTION, SOLUTION INTRAMUSCULAR; INTRAVENOUS at 15:12

## 2025-06-09 RX ADMIN — FENTANYL CITRATE 50 MCG: 50 INJECTION, SOLUTION INTRAMUSCULAR; INTRAVENOUS at 14:26

## 2025-06-09 RX ADMIN — ONDANSETRON 4 MG: 2 INJECTION INTRAMUSCULAR; INTRAVENOUS at 14:45

## 2025-06-09 RX ADMIN — OMEPRAZOLE 20 MG: 20 CAPSULE, DELAYED RELEASE ORAL at 17:41

## 2025-06-09 RX ADMIN — KETOROLAC TROMETHAMINE 15 MG: 15 INJECTION, SOLUTION INTRAMUSCULAR; INTRAVENOUS at 14:45

## 2025-06-09 SDOH — ECONOMIC STABILITY: TRANSPORTATION INSECURITY
IN THE PAST 12 MONTHS, HAS THE LACK OF TRANSPORTATION KEPT YOU FROM MEDICAL APPOINTMENTS OR FROM GETTING MEDICATIONS?: NO

## 2025-06-09 SDOH — ECONOMIC STABILITY: TRANSPORTATION INSECURITY
IN THE PAST 12 MONTHS, HAS LACK OF RELIABLE TRANSPORTATION KEPT YOU FROM MEDICAL APPOINTMENTS, MEETINGS, WORK OR FROM GETTING THINGS NEEDED FOR DAILY LIVING?: NO

## 2025-06-09 ASSESSMENT — ENCOUNTER SYMPTOMS
HEADACHES: 0
DIZZINESS: 0
CHILLS: 0
ABDOMINAL PAIN: 0
EYE PAIN: 0
LOSS OF CONSCIOUSNESS: 0
SENSORY CHANGE: 0
FALLS: 1
BACK PAIN: 0
FOCAL WEAKNESS: 0
PALPITATIONS: 0
VOMITING: 0
NAUSEA: 0
COUGH: 0
SHORTNESS OF BREATH: 0
INSOMNIA: 0
FEVER: 0
BLURRED VISION: 0

## 2025-06-09 ASSESSMENT — PAIN DESCRIPTION - PAIN TYPE
TYPE: ACUTE PAIN;SURGICAL PAIN
TYPE: ACUTE PAIN;SURGICAL PAIN
TYPE: SURGICAL PAIN
TYPE: ACUTE PAIN
TYPE: SURGICAL PAIN
TYPE: ACUTE PAIN
TYPE: ACUTE PAIN;SURGICAL PAIN
TYPE: SURGICAL PAIN
TYPE: SURGICAL PAIN
TYPE: ACUTE PAIN;SURGICAL PAIN
TYPE: SURGICAL PAIN

## 2025-06-09 ASSESSMENT — PATIENT HEALTH QUESTIONNAIRE - PHQ9
SUM OF ALL RESPONSES TO PHQ9 QUESTIONS 1 AND 2: 0
2. FEELING DOWN, DEPRESSED, IRRITABLE, OR HOPELESS: NOT AT ALL
1. LITTLE INTEREST OR PLEASURE IN DOING THINGS: NOT AT ALL

## 2025-06-09 ASSESSMENT — LIFESTYLE VARIABLES
ON A TYPICAL DAY WHEN YOU DRINK ALCOHOL HOW MANY DRINKS DO YOU HAVE: 0
EVER HAD A DRINK FIRST THING IN THE MORNING TO STEADY YOUR NERVES TO GET RID OF A HANGOVER: NO
HOW MANY TIMES IN THE PAST YEAR HAVE YOU HAD 5 OR MORE DRINKS IN A DAY: 0
AVERAGE NUMBER OF DAYS PER WEEK YOU HAVE A DRINK CONTAINING ALCOHOL: 0
EVER FELT BAD OR GUILTY ABOUT YOUR DRINKING: NO
DOES PATIENT WANT TO STOP DRINKING: NO
ALCOHOL_USE: NO
HAVE PEOPLE ANNOYED YOU BY CRITICIZING YOUR DRINKING: NO
HAVE YOU EVER FELT YOU SHOULD CUT DOWN ON YOUR DRINKING: NO
SUBSTANCE_ABUSE: 0
CONSUMPTION TOTAL: INCOMPLETE

## 2025-06-09 ASSESSMENT — SOCIAL DETERMINANTS OF HEALTH (SDOH)
WITHIN THE LAST YEAR, HAVE TO BEEN RAPED OR FORCED TO HAVE ANY KIND OF SEXUAL ACTIVITY BY YOUR PARTNER OR EX-PARTNER?: NO
WITHIN THE LAST YEAR, HAVE YOU BEEN HUMILIATED OR EMOTIONALLY ABUSED IN OTHER WAYS BY YOUR PARTNER OR EX-PARTNER?: NO
WITHIN THE LAST YEAR, HAVE YOU BEEN AFRAID OF YOUR PARTNER OR EX-PARTNER?: NO
WITHIN THE LAST YEAR, HAVE YOU BEEN KICKED, HIT, SLAPPED, OR OTHERWISE PHYSICALLY HURT BY YOUR PARTNER OR EX-PARTNER?: NO
WITHIN THE PAST 12 MONTHS, THE FOOD YOU BOUGHT JUST DIDN'T LAST AND YOU DIDN'T HAVE MONEY TO GET MORE: NEVER TRUE
IN THE PAST 12 MONTHS, HAS THE ELECTRIC, GAS, OIL, OR WATER COMPANY THREATENED TO SHUT OFF SERVICE IN YOUR HOME?: NO
WITHIN THE PAST 12 MONTHS, YOU WORRIED THAT YOUR FOOD WOULD RUN OUT BEFORE YOU GOT THE MONEY TO BUY MORE: NEVER TRUE

## 2025-06-09 NOTE — OP REPORT
DATE OF OPERATION: 6/9/2025     PREOPERATIVE DIAGNOSIS:  Left nondisplaced intertrochanteric femur fracture    POSTOPERATIVE DIAGNOSIS: Same    PROCEDURE PERFORMED:   Open reduction internal fixation left intertrochanteric femur fracture with cephalomedullary implant    SURGEON: Yair Neely M.D.     ASSISTANT: Leonel Mata MD - ortho trauma fellow The use of the fellow as a surgical assistant was necessary for assistance with exposure, retraction, fracture reduction, instrumentation, and closure.      ANESTHESIA: General    SPECIMEN: None    ESTIMATED BLOOD LOSS: 25 mL    IMPLANTS: [place] 10 x 125 short cephalomedullary nail, 95 mm lag screw, single interlocking screw    INDICATIONS: The patient is a 85 y.o. female who presented with a left nondisplaced intertrochanteric femur fracture.  I discussed the risks and benefits of the above procedure which include but are not limited to risks of infection, wound healing complication, neurovascular injury, pain, malunion, non-union, malrotation, and the medical risks of anesthesia including MI, stroke, and death.  Alternatives to surgery were also discussed, including non-operative management, which I did not recommend.  The patient and/or their POA was in agreement with the plan to proceed, and the informed consent was signed and documented.    DESCRIPTION OF PROCEDURE:  Patient was seen in the preoperative holding area on the day of surgery and marked on the operative site which was the left hip. They were transported to the operating room.  Anesthesia was induced and the patient was placed into bilateral well-padded fracture boots.  They were then positioned supine on the orthopedic table with a well padded perineal post.  Care was taken to pad any bony prominences and prominent neurovascular structures.  Fluoroscopy confirmed no displacement of the fracture.  The operative extremity was then prescrubbed with a CHG brush followed by an alcohol bath and then  prepped and draped in the usual sterile fashion.  A time out was performed in which the correct patient, site, side, procedure, and surgeon's initials on extremity were confirmed by all operating personnel.     We then turned our attention to the left hip.  A longitudinal incision was made proximal to the tip of the greater trochanter.  A 10 blade was used to incise through the skin, subcutaneous tissue, and fascia and a starting guidepin was placed through the incision and appropriate starting position at the tip of the greater trochanter were confirmed on AP and lateral views and it was advanced into the femur.  The opening reamer was then placed over the guidepin and the proximal femur was opened.  These were then both removed and our implant was selected which was a size 10 x 125 [place] short cephalomedullary nail.  It was assembled on the back table and inserted into the proximal femur under fluoroscopic guidance and advanced to final position using a mallet.  The trochar was then inserted into the aiming arm and through a separate lateral incision.  A guidepin was then inserted through the trochar into the head neck segment and appropriate center-center position was confirmed on fluoroscopy.  I then measured for, drilled for, and placed a lag screw into the head neck segment.  Tip apex distance was appropriate on AP and lateral views.  Traction was then let off and the set screw was tightened and backed off one quarter turn to allow controlled collapse.  The aiming arm was then used to place a single distal interlocking bolt through a separate stab incision.  The insertion handle was then removed and final fluoroscopic images were obtained which demonstrated restoration of length, alignment, and rotation as well as safe and appropriate position of our implants.  All wounds were then thoroughly irrigated with saline.  The skin was then closed in layers with 2-0 vicryl followed by staples.  Sterile dressings  were then placed.  The patient was then taken off the orthopedic table and taken out of the fracture boots.  The patient's clinical rotation was then assessed and noted to be symmetric to the contralateral side.  The patient was then awoken from anesthesia without immediate complication and transported to the PACU in stable condition.     POSTOPERATIVE PLAN:      Inpatient plan: PACU to floor. 24 hours of antibiotics.  Mobilize with PT/OT.  Weightbearing status:  WBAT left lower extremity  DVT prophylaxis: SCDs and lovenox until mobilizing well, then aspirin 81mg BID for 4 weeks.  If the patient is on baseline anticoagulation then they may resume their medication 24 hours postoperatively.  Outpatient plan: The patient will follow up in clinic in 2 weeks for wound check, suture/staple removal, and xrays.  If the patient is at a facility at that time, wound check and suture/staple removal may be performed by nursing care and the patient should instead follow up at the 6 week post operative sameer for repeat clinical check and xrays.      ____________________________________   Yair Neely M.D.   DD: 6/9/2025  3:01 PM

## 2025-06-09 NOTE — CARE PLAN
Problem: Pain - Standard  Goal: Alleviation of pain or a reduction in pain to the patient’s comfort goal  Description: Target End Date:  Prior to discharge or change in level of careDocument on Vitals flowsheet1.  Document pain using the appropriate pain scale per order or unit policy2.  Educate and implement non-pharmacologic comfort measures (i.e. relaxation, distraction, massage, cold/heat therapy, etc.)3.  Pain management medications as ordered4.  Reassess pain after pain med administration per policy5.  If opiods administered assess patient's response to pain medication is appropriate per POSS sedation scale6.  Follow pain management plan developed in collaboration with patient and interdisciplinary team (including palliative care or pain specialists if applicable)  Outcome: Progressing     Problem: Pain - Standard  Goal: Alleviation of pain or a reduction in pain to the patient’s comfort goal  Description: Target End Date:  Prior to discharge or change in level of careDocument on Vitals flowsheet1.  Document pain using the appropriate pain scale per order or unit policy2.  Educate and implement non-pharmacologic comfort measures (i.e. relaxation, distraction, massage, cold/heat therapy, etc.)3.  Pain management medications as ordered4.  Reassess pain after pain med administration per policy5.  If opiods administered assess patient's response to pain medication is appropriate per POSS sedation scale6.  Follow pain management plan developed in collaboration with patient and interdisciplinary team (including palliative care or pain specialists if applicable)  Outcome: Progressing     Problem: Knowledge Deficit - Standard  Goal: Patient and family/care givers will demonstrate understanding of plan of care, disease process/condition, diagnostic tests and medications  Description: Target End Date:  1-3 days or as soon as patient condition allowsDocument in Patient Education1.  Patient and family/caregiver oriented  to unit, equipment, visitation policy and means for communicating concern2.  Complete/review Learning Assessment3.  Assess knowledge level of disease process/condition, treatment plan, diagnostic tests and medications4.  Explain disease process/condition, treatment plan, diagnostic tests and medications  Outcome: Met     Problem: Skin Integrity  Goal: Skin integrity is maintained or improved  Description: Target End Date:  Prior to discharge or change in level of careDocument interventions on Skin Risk/Ronan flowsheet groups and corresponding LDA1.  Assess and monitor skin integrity, appearance and/or temperature2.  Assess risk factors for impaired skin integrity and/or pressures ulcers3.  Implement precautions to protect skin integrity in collaboration with interdisciplinary team4.  Implement pressure ulcer prevention protocol if at risk for skin breakdown5.  Confirm wound care consult if at risk for skin breakdown6.  Ensure patient use of pressure relieving devices  (Low air loss bed, waffle overlay, heel protectors, ROHO cushion, etc)  Outcome: Met     Problem: Fall Risk  Goal: Patient will remain free from falls  Description: Target End Date:  Prior to discharge or change in level of careDocument interventions on the [facility] Fall Risk Assessment1.  Assess for fall risk factors2.  Implement fall precautions  Outcome: Met     Problem: Knowledge Deficit - Standard  Goal: Patient and family/care givers will demonstrate understanding of plan of care, disease process/condition, diagnostic tests and medications  Description: Target End Date:  1-3 days or as soon as patient condition allowsDocument in Patient Education1.  Patient and family/caregiver oriented to unit, equipment, visitation policy and means for communicating concern2.  Complete/review Learning Assessment3.  Assess knowledge level of disease process/condition, treatment plan, diagnostic tests and medications4.  Explain disease process/condition,  treatment plan, diagnostic tests and medications  Outcome: Met     Problem: Skin Integrity  Goal: Skin integrity is maintained or improved  Description: Target End Date:  Prior to discharge or change in level of careDocument interventions on Skin Risk/Ronan flowsheet groups and corresponding LDA1.  Assess and monitor skin integrity, appearance and/or temperature2.  Assess risk factors for impaired skin integrity and/or pressures ulcers3.  Implement precautions to protect skin integrity in collaboration with interdisciplinary team4.  Implement pressure ulcer prevention protocol if at risk for skin breakdown5.  Confirm wound care consult if at risk for skin breakdown6.  Ensure patient use of pressure relieving devices  (Low air loss bed, waffle overlay, heel protectors, ROHO cushion, etc)  Outcome: Met     Problem: Fall Risk  Goal: Patient will remain free from falls  Description: Target End Date:  Prior to discharge or change in level of careDocument interventions on the [facility] Fall Risk Assessment1.  Assess for fall risk factors2.  Implement fall precautions  Outcome: Met   The patient is Stable - Low risk of patient condition declining or worsening    Shift Goals  Clinical Goals: Pain control, monito blood sugar, NPO sips with meds at MN, CT scan LLE  Patient Goals: Pain control, rest, comfort  Family Goals: N/A    Progress made toward(s) clinical / shift goals:      Pt. Vital signs WDL.   Pt. Has no new skin integrity issue/ impairment.   Pt. Verbalized understanding on the care provided.   Pt. Rated pain between 0 and 5 from 1-10, 10 being the most painful. Pain medications given as ordered.   Pt. Didn't fall under RN care. Fall precautions in place.

## 2025-06-09 NOTE — CARE PLAN
The patient is Stable - Low risk of patient condition declining or worsening    Shift Goals  Clinical Goals: (P) pain management, surgery today  Patient Goals: (P) pain control, rest  Family Goals: (P) not present    Progress made toward(s) clinical / shift goals:    Problem: Mobility  Goal: Patient's capacity to carry out activities will improve  Outcome: Progressing  Flowsheets (Taken 6/9/2025 0927)  Mobility: Monitored for signs of activity intolerance     Problem: Self Care  Goal: Patient will have the ability to perform ADLs independently or with assistance (bathe, groom, dress, toilet and feed)  Outcome: Progressing     Problem: Infection - Standard  Goal: Patient will remain free from infection  Outcome: Progressing  Flowsheets (Taken 6/9/2025 0927)  Standard Infection Interventions:   Assessed for signs and symptoms of infection   Instructed patient/family on signs and symptoms of infection   Provided education on proper hand hygiene and infection prevention measures   Assessed for removal IV, central lines, intra-arterial or urinary catheters     Problem: Wound/ / Incision Healing  Goal: Patient's wound/surgical incision will decrease in size and heals properly  Outcome: Progressing       Patient is not progressing towards the following goals:

## 2025-06-09 NOTE — ANESTHESIA TIME REPORT
Anesthesia Start and Stop Event Times       Date Time Event    6/9/2025 1350 Ready for Procedure     1407 Anesthesia Start     1457 Anesthesia Stop          Responsible Staff  06/09/25      Name Role Begin End    Jonny Winkler D.O. Anesth 1407 [id number]          Overtime Reason:  no overtime (within assigned shift)    Comments:

## 2025-06-09 NOTE — PROGRESS NOTES
6721 - Report received from Antonina PIÑA.    1600 - Pt arrived to unit via transport on hospital bed. A&O x 4, pain 9/10, on 3L O2, dressing to L hip in place, with all belongings at bedside. Call light and belongings within reach. Bed locked and in lowest position.

## 2025-06-09 NOTE — ANESTHESIA PROCEDURE NOTES
Airway    Date/Time: 6/9/2025 2:12 PM    Performed by: Jonny Winkler D.O.  Authorized by: Jonny Winkler D.O.    Location:  OR  Urgency:  Elective  Indications for Airway Management:  Anesthesia      Spontaneous Ventilation: absent    Sedation Level:  Deep  Preoxygenated: Yes    Patient Position:  Sniffing  MILS Maintained Throughout: No    Mask Difficulty Assessment:  0 - not attempted  Final Airway Type:  Supraglottic airway  Final Supraglottic Airway:  Standard LMA    SGA Size:  3  Number of Attempts at Approach:  1  Ventilation Between Attempts:  None  Number of Other Approaches Attempted:  0

## 2025-06-09 NOTE — THERAPY
Physical Therapy Contact Note    Patient Name: Kristen Collado  Age:  85 y.o., Sex:  female  Medical Record #: [id number]  Today's Date: 6/9/2025 06/09/25 0766   Initial Contact Note    Initial Contact Note Order Received and Verified, Physical Therapy Evaluation in Progress with Full Report to Follow.   Interdisciplinary Plan of Care Collaboration   Collaboration Comments PT order received.  Pt is scheduled for hip surgery today.  PT will follow up post op.   Session Information   Date / Session Number  6/9- sx today  (EVAL)

## 2025-06-09 NOTE — ASSESSMENT & PLAN NOTE
Admit to:    Orthopedic/Med-Surg floor since no major co-morbidities.     Cardiovascular:   Patient does not have history of CHF  Pre-op EKG: Yes    Pulmonary:  Oxygen per protocol    GI:   No history of cirrhosis. Standard bowel regimen. Hold for loose stools.    Renal:   IV fluids: -150 mL/hr for 2 days   Labs: Metabolic Panel with AM labs    Musculoskeletal:   Check 25 OH vitamin D level. If 31-40 pg/mL, consider starting vitamin D3 1000 IU PO daily. If 20-30 pg/mL, consider vitamin D3 2000 IU PO daily. If <20 pg/mL, vitamin D2 50,000 IU weekly x 8 weeks then 2000 IU PO daily.      Hematologic:  Plan on pharmacologic DVT prophylaxis post operative day #1. Hold for decreasing hemoglobin. Notify provider for hemoglobin less than 8.  Order preoperative type and cross.   Hgb every 6 hours if high bleeding risk.   If patient was on anticoagulation prior to arrival risks and benefits will be weighed by teams including surgery, hospitalist, geriatrics, and anesthesia for delaying surgery more than 24 hours.   On anticoagulation prior to arrival: No    Medical Assessment Risk:  Intermediate    Surgical Risk:   Intermediate

## 2025-06-09 NOTE — H&P
Hospital Medicine History & Physical Note    Date of Service  6/8/2025    Primary Care Physician  HANNAH Bray.    Consultants  Orthopedic surgery    Code Status  DNAR/DNI    Chief Complaint  Chief Complaint   Patient presents with    Hip Pain    GLF       History of Presenting Illness  Kristen Collado is a 85 y.o. female who presented 6/8/2025 with a mechanical fall.  Patient has a history of hypertension, hyperlipidemia, diabetes.  She was cleaning her restroom tonight, when she reached over to grab an object, she slipped forward and fell on her left hip.  She denies head strike and loss of consciousness.  She was unable to get up for some time, but was able to bear weight and ambulate despite the pain and get to the phone to call for help.  She was brought to the ER for further evaluation    In ER, patient found to be hypertensive.  Pertinent labs include neutrophil leukocytosis.  X-ray hip, left showing findings concerning for nondisplaced intertrochanteric left proximal femur fracture, possible nondisplaced right femoral neck fracture, diffuse osteopenia, possible right femoral neck fracture.  Orthopedic surgery consulted, recommend CT hip and will see patient in the morning.    I discussed the plan of care with patient.    Review of Systems  Review of Systems   Constitutional:  Positive for malaise/fatigue.   HENT: Negative.     Eyes: Negative.    Respiratory: Negative.     Cardiovascular: Negative.    Gastrointestinal: Negative.    Genitourinary: Negative.    Musculoskeletal:  Positive for falls and joint pain.   Skin: Negative.    Neurological: Negative.    Endo/Heme/Allergies: Negative.    Psychiatric/Behavioral: Negative.         Past Medical History   has a past medical history of Arthritis, Dental disorder, Diabetes (2002), Hypertension, Pain, Psychiatric problem, and Unspecified disorder of thyroid.    Surgical History   has a past surgical history that includes bladder sling  female (3/3/2009); cystoscopy (3/3/2009); appendectomy (1947); tonsillectomy (1948); dilation and curettage (1961, 1983); hysterectomy, total abdominal (1984); cervical disk and fusion anterior (1995,1996); fusion, spine, lumbar, plif (1995, 1997, 2010); lumbar fusion anterior (1996, 2010); lumbar decompression (1997, 2010); and gastric bypass laparoscopic (10/16/2012).     Family History  family history includes Cancer in her unknown relative; Diabetes in her unknown relative; Heart Disease in her unknown relative; Hypertension in her unknown relative; Stroke in her unknown relative.   Family history reviewed with patient. There is no family history that is pertinent to the chief complaint.     Social History   reports that she has quit smoking. She has a 35 pack-year smoking history. She does not have any smokeless tobacco history on file. She reports that she does not drink alcohol and does not use drugs.    Allergies  Allergies[1]    Medications  Prior to Admission Medications   Prescriptions Last Dose Informant Patient Reported? Taking?   albuterol (VENTOLIN OR PROVENTIL) 108 (90 BASE) MCG/ACT AERS   No No   Sig: Inhale 2 Puffs by mouth every four hours as needed for Shortness of Breath. With spacer.  Pro air okay also   allopurinol (ZYLOPRIM) 100 MG TABS  Patient Yes No   Sig: Take 100 mg by mouth 2 Times a Day.     atorvastatin (LIPITOR) 20 MG TABS  Patient Yes No   Sig: Take 20 mg by mouth every evening.     benzonatate (TESSALON) 200 MG capsule   No No   Sig: Take 1 Cap by mouth 3 times a day as needed for Cough.   bisoprolol-hydrochlorothiazide (ZIAC) 5-6.25 MG TABS  Patient Yes No   Sig: Take 1 Tab by mouth every day.     doxycycline (VIBRAMYCIN) 100 MG TABS   No No   Sig: Take 1 Tab by mouth 2 times a day.   duloxetine (CYMBALTA) 60 MG CPEP  Patient Yes No   Sig: Take 60 mg by mouth every day.     hydrocodone-acetaminophen 2.5-167 mg/5 mL solution (LORTAB) 7.5-500 MG/15ML SOLN   Yes No   Sig: Take 15 mL  by mouth every four hours as needed.     levothyroxine (SYNTHROID) 25 MCG TABS  Patient Yes No   Sig: Take 25 mcg by mouth every day.     lisinopril (PRINIVIL, ZESTRIL) 30 MG tablet  Patient Yes No   Sig: Take 30 mg by mouth every day.     meloxicam (MOBIC) 7.5 MG TABS  Patient Yes No   Sig: Take 7.5 mg by mouth every day.     omeprazole (PRILOSEC) 20 MG CPDR  Patient Yes No   Sig: Take 20 mg by mouth 2 Times a Day.     trazodone (DESYREL) 100 MG TABS  Patient Yes No   Sig: Take 100 mg by mouth every evening.        Facility-Administered Medications: None       Physical Exam  Temp:  [36.7 °C (98 °F)] 36.7 °C (98 °F)  Pulse:  [76-98] 76  Resp:  [16-18] 16  BP: (169-205)/() 205/90  SpO2:  [92 %-100 %] 92 %  Blood Pressure : (!) 205/90   Temperature: 36.7 °C (98 °F)   Pulse: 76   Respiration: 16   Pulse Oximetry: 92 %       Physical Exam  Constitutional:       Appearance: Normal appearance. She is normal weight.   HENT:      Head: Normocephalic.      Nose: Nose normal.      Mouth/Throat:      Mouth: Mucous membranes are moist.   Eyes:      Pupils: Pupils are equal, round, and reactive to light.   Cardiovascular:      Rate and Rhythm: Normal rate and regular rhythm.      Pulses: Normal pulses.   Pulmonary:      Effort: Pulmonary effort is normal.      Breath sounds: Normal breath sounds.   Abdominal:      General: Abdomen is flat. Bowel sounds are normal.      Palpations: Abdomen is soft.   Musculoskeletal:      Cervical back: Neck supple.      Comments: Range of motion limited left lower extremity due to pain, dorsalis pedis 2+ on left foot, feet are warm and well-perfused   Skin:     General: Skin is warm.   Neurological:      Mental Status: She is alert and oriented to person, place, and time. Mental status is at baseline.   Psychiatric:         Mood and Affect: Mood normal.         Behavior: Behavior normal.         Thought Content: Thought content normal.         Judgment: Judgment normal.  "        Laboratory:  Recent Labs     06/08/25 2006   WBC 12.0*   RBC 4.01*   HEMOGLOBIN 11.3*   HEMATOCRIT 35.8*   MCV 89.3   MCH 28.2   MCHC 31.6*   RDW 45.3   PLATELETCT 203   MPV 10.3         No results for input(s): \"ALTSGPT\", \"ASTSGOT\", \"ALKPHOSPHAT\", \"TBILIRUBIN\", \"DBILIRUBIN\", \"GAMMAGT\", \"AMYLASE\", \"LIPASE\", \"ALB\", \"PREALBUMIN\", \"GLUCOSE\" in the last 72 hours.      No results for input(s): \"NTPROBNP\" in the last 72 hours.      No results for input(s): \"TROPONINT\" in the last 72 hours.    Imaging:  DX-CHEST-PORTABLE (1 VIEW)   Final Result      Patchy left basilar opacities, atelectasis or infection.      DX-HIP-COMPLETE - UNILATERAL 2+ LEFT   Final Result      1.  Findings concerning for nondisplaced intertrochanteric LEFT proximal femur fracture.   2.  Possible nondisplaced RIGHT femoral neck fracture.   3.  Diffuse osteopenia.   4.  Possible RIGHT femoral neck fracture.      CT-HIP W/O PLUS RECONS LEFT    (Results Pending)   DX-FEMUR-2+ LEFT    (Results Pending)       X-Ray:  I have personally reviewed the images and compared with prior images.    Assessment/Plan:  Justification for Admission Status  I anticipate this patient will require at least two midnights for appropriate medical management, necessitating inpatient admission because patient has suspected left femur fracture    Patient will need a Med/Surg bed on EMERGENCY service .  The need is secondary to femur fracture.    * Femur fracture (HCC)  Assessment & Plan  Noted to have a mechanical fall and landed on her left hip, barely able to bear weight after  Orthopedic surgery consulted, recommends CT hip  N.p.o. midnight  Fluids  Pain medication    ACP (advance care planning)  Assessment & Plan  17 minutes spent discussing goals of care with patient.  She previously had not discussed CODE STATUS in the past.  I went over the process of CPR and inquired about what she would want in the event of a clinical deterioration and cardiac arrest.  Patient " states that she has lived a long life and is happy with how she has lived.  Her  passed away over 10 years ago and she lives alone with her dogs.  Patient will be DNR/DNI    Encounter for preoperative assessment  Assessment & Plan  Admit to:    Orthopedic/Med-Surg floor since no major co-morbidities.     Cardiovascular:   Patient does not have history of CHF  Pre-op EKG: Yes    Pulmonary:  Oxygen per protocol    GI:   No history of cirrhosis. Standard bowel regimen. Hold for loose stools.    Renal:   IV fluids: -150 mL/hr for 2 days   Labs: Metabolic Panel with AM labs    Musculoskeletal:   Check 25 OH vitamin D level. If 31-40 pg/mL, consider starting vitamin D3 1000 IU PO daily. If 20-30 pg/mL, consider vitamin D3 2000 IU PO daily. If <20 pg/mL, vitamin D2 50,000 IU weekly x 8 weeks then 2000 IU PO daily.      Hematologic:  Plan on pharmacologic DVT prophylaxis post operative day #1. Hold for decreasing hemoglobin. Notify provider for hemoglobin less than 8.  Order preoperative type and cross.   Hgb every 6 hours if high bleeding risk.   If patient was on anticoagulation prior to arrival risks and benefits will be weighed by teams including surgery, hospitalist, geriatrics, and anesthesia for delaying surgery more than 24 hours.   On anticoagulation prior to arrival: No    Medical Assessment Risk:  Intermediate    Surgical Risk:   Intermediate      Hypertension  Assessment & Plan  Resume home meds    Hyperlipidemia  Assessment & Plan  Lipitor         VTE prophylaxis: pharmacologic prophylaxis contraindicated due to OR in am        [1]   Allergies  Allergen Reactions    Nkda [No Known Drug Allergy]

## 2025-06-09 NOTE — CONSULTS
"6/9/2025      HPI: Kristen Collado is a 85 y.o. female with a history of diabetes, hypothyroidism, hypertension, depression who presents with a closed left intertrochanteric femur fracture after a mechanical fall while mopping.  Currently complains of isolated left hip pain and inability to ambulate.  Denies numbness or tingling in her foot.    Past Medical History[1]    Past Surgical History[2]    Medications  Medications Ordered Prior to Encounter[3]    Allergies  Patient has no known allergies.    ROS  Negative except as indicated in the HPI    Family History   Problem Relation Age of Onset    Diabetes Unknown     Heart Disease Unknown     Hypertension Unknown     Stroke Unknown     Cancer Unknown        Social History[4]    Physical Exam  Vitals  BP (!) 180/77   Pulse 77   Temp (!) 35.8 °C (96.4 °F) (Temporal)   Resp 16   Ht 1.676 m (5' 6\")   Wt 79.4 kg (175 lb)   SpO2 94%   General: NAD  HEENT: Normocephalic, atraumatic  Psych: Normal mood and affect  Neck: No collar in place, normal appearing motion  Lungs: No increased work of breathing  Heart: Regular rate by palpation of peripheral pulse  Abdomen: Nondistended  MSK:   On inspection of the left lower extremity it is held externally rotated.  Pain with any motion at the hip including log roll. Patient unable to straight leg raise. Sensation intact to light touch all distributions distally.  Moves ankle and toes up/down.  Foot warm and well perfused with a 2+ DP pulse.      Radiographs:  AP pelvis and xrays of the left femur demonstrate a nondisplaced intertrochanteric femur fracture.    CT scan of left hip demonstrates the above fracture.    CT-HIP W/O PLUS RECONS LEFT   Final Result         1.  Mildly displaced fracture through the left femoral metaphysis and neck.   2.  Atherosclerosis.         DX-FEMUR-2+ LEFT   Final Result         1.  Subtle proximal femoral metaphyseal fracture.      DX-CHEST-PORTABLE (1 VIEW)   Final Result      Patchy left " basilar opacities, atelectasis or infection.      DX-HIP-COMPLETE - UNILATERAL 2+ LEFT   Final Result      1.  Findings concerning for nondisplaced intertrochanteric LEFT proximal femur fracture.   2.  Possible nondisplaced RIGHT femoral neck fracture.   3.  Diffuse osteopenia.   4.  Possible RIGHT femoral neck fracture.      DX-PORTABLE FLUOROSCOPY < 1 HOUR Reason For Exam: Main OR    (Results Pending)   DX-HIP-UNILATERAL-W/O PELVIS-2/3 VIEWS LEFT    (Results Pending)       Laboratory Values  Recent Labs     06/08/25 2006 06/09/25  0054   WBC 12.0* 10.2   RBC 4.01* 3.95*   HEMOGLOBIN 11.3* 11.2*   HEMATOCRIT 35.8* 35.0*   MCV 89.3 88.6   MCH 28.2 28.4   MCHC 31.6* 32.0*   RDW 45.3 45.1   PLATELETCT 203 187   MPV 10.3 10.8     Recent Labs     06/08/25 2006 06/09/25 0054   SODIUM 139 139   POTASSIUM 4.1 3.7   CHLORIDE 107 106   CO2 22 21   GLUCOSE 110* 159*   BUN 17 15             Assessment: 85 y.o. female with a left intertrochanteric femur fracture after mechanical fall.      Plan: We discussed the diagnosis and findings with the patient at length.  We reviewed possible non operative and operative interventions and the risks and benefits of each of these.  The patient and/or their POA had a chance to ask questions and all of these were answered to their satisfaction. The patient and/or their POA chose to proceed with  operative intervention to include surgical fixation of the left hip. Risks and benefits of surgery were discussed which include but are not limited to bleeding, infection, neurovascular damage, malunion, nonunion, instability, limb length discrepancy, DVT, PE, MI, Stroke and death. They understand these risks and wish to proceed.      N.p.o. for or  This injury requires urgent surgical intervention to prevent significant morbidity/mortality  I specifically discussed operative versus nonoperative management thoroughly with the patient and she opted for surgical management of this injury      Yair  MD Chin  Orthopedic Trauma         [1]   Past Medical History:  Diagnosis Date    Arthritis     Dental disorder     upper and lower dentures    Diabetes 2002    oral agents    Hypertension     Pain     back and neck    Psychiatric problem     depression    Unspecified disorder of thyroid     hypothyroid   [2]   Past Surgical History:  Procedure Laterality Date    GASTRIC BYPASS LAPAROSCOPIC  10/16/2012    Performed by Sanchez Osuna M.D. at SURGERY [facility]    BLADDER SLING FEMALE  3/3/2009    Performed by WE[facility] GOLDBERG at SURGERY SAME DAY [facility]    CYSTOSCOPY  3/3/2009    Performed by [facility] at SURGERY SAME DAY [facility]    HYSTERECTOMY, TOTAL ABDOMINAL  1984    TONSILLECTOMY  1948    APPENDECTOMY  1947    CERVICAL DISK AND FUSION ANTERIOR  1995,1996    total four surgeries    DILATION AND CURETTAGE  1961, 1983    D & C    FUSION, SPINE, LUMBAR, PLIF  1995, 1997, 2010    LUMBAR DECOMPRESSION  1997, 2010    LUMBAR FUSION ANTERIOR  1996, 2010   [3]   No current facility-administered medications on file prior to encounter.     Current Outpatient Medications on File Prior to Encounter   Medication Sig Dispense Refill    amLODIPine (NORVASC) 5 MG Tab Take 5 mg by mouth every evening.      JARDIANCE 25 MG Tab Take 25 mg by mouth every evening.      meloxicam (MOBIC) 15 MG tablet Take 15 mg by mouth every evening.      traZODone (DESYREL) 50 MG Tab Take 50 mg by mouth at bedtime.      omeprazole (PRILOSEC) 20 MG CPDR Take 20 mg by mouth every evening.        DULoxetine (CYMBALTA) 30 MG Cap DR Particles Take 30 mg by mouth every evening.        atorvastatin (LIPITOR) 20 MG TABS Take 20 mg by mouth every evening.        lisinopril (PRINIVIL) 5 MG Tab Take 5 mg by mouth every day.     [4]   Social History  Socioeconomic History    Marital status:    Tobacco Use    Smoking status: Former     Current packs/day: 1.00     Average packs/day: 1 pack/day for 35.0 years (35.0 ttl pk-yrs)      Types: Cigarettes   Substance and Sexual Activity    Alcohol use: No    Drug use: No     Social Drivers of Health     Food Insecurity: No Food Insecurity (6/9/2025)    Hunger Vital Sign     Worried About Running Out of Food in the Last Year: Never true     Ran Out of Food in the Last Year: Never true   Transportation Needs: No Transportation Needs (6/9/2025)    PRAPARE - Transportation     Lack of Transportation (Medical): No     Lack of Transportation (Non-Medical): No   Intimate Partner Violence: Not At Risk (6/9/2025)    Humiliation, Afraid, Rape, and Kick questionnaire     Fear of Current or Ex-Partner: No     Emotionally Abused: No     Physically Abused: No     Sexually Abused: No   Housing Stability: Low Risk  (6/9/2025)    Housing Stability Vital Sign     Unable to Pay for Housing in the Last Year: No     Number of Times Moved in the Last Year: 0     Homeless in the Last Year: No

## 2025-06-09 NOTE — ANESTHESIA PREPROCEDURE EVALUATION
Case: [id number] Date/Time: 06/09/25 1245    Procedure: INSERTION, INTRAMEDULLARY RUFINO, FEMUR, PROXIMAL (Left)    Location: [facility] OR  / SURGERY [facility]    Surgeons: Yair Neely M.D.          Tj H&P:  PAST MEDICAL HISTORY:   85 y.o. female who presents for Procedure(s) (LRB):  INSERTION, INTRAMEDULLARY RUFINO, FEMUR, PROXIMAL (Left).  She has current and past medical problems significant for:    Past Medical History[1]    SMOKING/ALCOHOL/RECREATIONAL DRUG USE:  Social History[2]  Social History     Substance and Sexual Activity   Drug Use No       PAST SURGICAL HISTORY:  Past Surgical History[3]    ALLERGIES:   Allergies[4]    MEDICATIONS:  Medications Ordered Prior to Encounter[5]    LABS:  Lab Results   Component Value Date/Time    HEMOGLOBIN 11.2 (L) 06/09/2025 0054    HEMATOCRIT 35.0 (L) 06/09/2025 0054    WBC 10.2 06/09/2025 0054     Lab Results   Component Value Date/Time    SODIUM 139 06/09/2025 0054    POTASSIUM 3.7 06/09/2025 0054    CHLORIDE 106 06/09/2025 0054    CO2 21 06/09/2025 0054    GLUCOSE 159 (H) 06/09/2025 0054    BUN 15 06/09/2025 0054    CALCIUM 8.8 06/09/2025 0054         PREVIOUS ANESTHETICS: See EMR  __________________________________________    Relevant Problems   CARDIAC   (positive) Hypertension      Other   (positive) Femur fracture (HCC)       Physical Exam    Airway   Mallampati: II  TM distance: >3 FB  Neck ROM: limited       Cardiovascular - normal exam  Rhythm: regular  Rate: normal    (-) murmur     Dental     (+) upper dentures, lower dentures           Pulmonary - normal examBreath sounds clear to auscultation     Abdominal    Neurological - normal exam                   Anesthesia Plan    ASA 2       Plan - general       Airway plan will be LMA          Induction: intravenous    Postoperative Plan: Postoperative administration of opioids is intended.    Pertinent diagnostic labs and testing reviewed    Informed Consent:    Anesthetic plan and risks discussed with  patient.    Use of blood products discussed with: patient whom consented to blood products.                [1]   Past Medical History:  Diagnosis Date    Arthritis     Dental disorder     upper and lower dentures    Diabetes 2002    oral agents    Hypertension     Pain     back and neck    Psychiatric problem     depression    Unspecified disorder of thyroid     hypothyroid   [2]   Social History  Tobacco Use    Smoking status: Former     Current packs/day: 1.00     Average packs/day: 1 pack/day for 35.0 years (35.0 ttl pk-yrs)     Types: Cigarettes   Substance Use Topics    Alcohol use: No    Drug use: No   [3]   Past Surgical History:  Procedure Laterality Date    GASTRIC BYPASS LAPAROSCOPIC  10/16/2012    Performed by Sanchez Osuna M.D. at SURGERY [facility]    BLADDER SLING FEMALE  3/3/2009    Performed by [facility] at SURGERY SAME DAY [facility]    CYSTOSCOPY  3/3/2009    Performed by [facility] at SURGERY SAME DAY [facility]    HYSTERECTOMY, TOTAL ABDOMINAL  1984    TONSILLECTOMY  1948    APPENDECTOMY  1947    CERVICAL DISK AND FUSION ANTERIOR  1995,1996    total four surgeries    DILATION AND CURETTAGE  1961, 1983    D & C    LUMBAR DECOMPRESSION  1997, 2010    LUMBAR FUSION ANTERIOR  1996, 2010    LUMBAR FUSION POSTERIOR  1995, 1997, 2010   [4] No Known Allergies  [5]   No current facility-administered medications on file prior to encounter.     Current Outpatient Medications on File Prior to Encounter   Medication Sig Dispense Refill    amLODIPine (NORVASC) 5 MG Tab Take 5 mg by mouth every evening.      JARDIANCE 25 MG Tab Take 25 mg by mouth every evening.      meloxicam (MOBIC) 15 MG tablet Take 15 mg by mouth every evening.      traZODone (DESYREL) 50 MG Tab Take 50 mg by mouth at bedtime.      omeprazole (PRILOSEC) 20 MG CPDR Take 20 mg by mouth every evening.        DULoxetine (CYMBALTA) 30 MG Cap DR Particles Take 30 mg by mouth every evening.        atorvastatin (LIPITOR)  20 MG TABS Take 20 mg by mouth every evening.

## 2025-06-09 NOTE — PROGRESS NOTES
Virtual Nurse rounding complete.  Admit profile completed by VRN.    Round Needs: No needs at this time.

## 2025-06-09 NOTE — THERAPY
Occupational Therapy Contact Note    Patient Name: Kristen Collado  Age:  85 y.o., Sex:  female  Medical Record #: [id number]  Today's Date: 6/9/2025    OT consult rec'd. Pending surgical intervention for femur fx; scheduled for today. Will hold OT eval and re-attempt as appropriate/able post op.

## 2025-06-09 NOTE — PROGRESS NOTES
[facility] Medicine Daily Progress Note    Date of Service  6/9/2025    Chief Complaint  Kristen Collado is a 85 y.o. female admitted 6/8/2025 with fall, hip pain.    Hospital Course    Kristen Collado is a 85 y.o. female who presented 6/8/2025 with a mechanical fall.  Patient has a history of hypertension, hyperlipidemia, diabetes.  She was cleaning her restroom tonight, when she reached over to grab an object, she slipped forward and fell on her left hip.  She denies head strike and loss of consciousness.  She was unable to get up for some time, but was able to bear weight and ambulate despite the pain and get to the phone to call for help.  She was brought to the ER for further evaluation     In ER, patient found to be hypertensive.  Pertinent labs include neutrophil leukocytosis.  X-ray hip, left showing findings concerning for nondisplaced intertrochanteric left proximal femur fracture, possible nondisplaced right femoral neck fracture, diffuse osteopenia, possible right femoral neck fracture.  Orthopedic surgery consulted, recommend CT hip and will see patient in the morning.    Interval Problem Update  No acute events overnight.  CT hip confirms mildly displaced fracture through left femoral metaphysis and neck.  NPO, plan for OR today for surgical fixation.  Patient feeling well, pain is minimal without any hip movement at bedside.  PT/OT evaluation post op. Appreciate surgery recommendations post op.  Continue home hypertension medications. Continue ISS for diabetes.      I have discussed this patient's plan of care and discharge plan at IDT rounds today with Case Management, Nursing, Nursing leadership, and other members of the IDT team.    Consultants/Specialty  orthopedics    Code Status  DNAR/DNI    Disposition  The patient is not medically cleared for discharge to home or a post-acute facility.      I have placed the appropriate orders for post-discharge needs.    Review of Systems  Review of  Systems   Constitutional:  Negative for chills and fever.   Eyes:  Negative for blurred vision and pain.   Respiratory:  Negative for cough and shortness of breath.    Cardiovascular:  Negative for chest pain, palpitations and leg swelling.   Gastrointestinal:  Negative for abdominal pain, nausea and vomiting.   Genitourinary:  Negative for dysuria and urgency.   Musculoskeletal:  Positive for falls and joint pain. Negative for back pain.   Skin:  Negative for itching and rash.   Neurological:  Negative for dizziness, sensory change, focal weakness, loss of consciousness and headaches.   Psychiatric/Behavioral:  Negative for substance abuse. The patient does not have insomnia.         Physical Exam  Temp:  [35.8 °C (96.4 °F)-36.7 °C (98 °F)] 35.8 °C (96.4 °F)  Pulse:  [70-98] 77  Resp:  [16-18] 16  BP: (143-205)/() 180/77  SpO2:  [91 %-100 %] 94 %    Physical Exam  Vitals reviewed.   Constitutional:       General: She is not in acute distress.     Appearance: She is not diaphoretic.   HENT:      Head: Normocephalic and atraumatic.      Right Ear: External ear normal.      Left Ear: External ear normal.      Nose: Nose normal. No congestion.      Mouth/Throat:      Pharynx: No oropharyngeal exudate or posterior oropharyngeal erythema.   Eyes:      Extraocular Movements: Extraocular movements intact.      Pupils: Pupils are equal, round, and reactive to light.   Cardiovascular:      Rate and Rhythm: Normal rate and regular rhythm.   Pulmonary:      Effort: Pulmonary effort is normal. No respiratory distress.      Breath sounds: Normal breath sounds. No wheezing.   Abdominal:      General: Bowel sounds are normal. There is no distension.      Palpations: Abdomen is soft.      Tenderness: There is no abdominal tenderness. There is no guarding.   Musculoskeletal:         General: Signs of injury present. No swelling.      Cervical back: Normal range of motion and neck supple.      Right lower leg: No edema.       Left lower leg: No edema.   Skin:     General: Skin is warm and dry.   Neurological:      General: No focal deficit present.      Mental Status: She is alert and oriented to person, place, and time.      Cranial Nerves: No cranial nerve deficit.      Motor: No weakness.   Psychiatric:         Mood and Affect: Mood normal.         Behavior: Behavior normal.         Fluids    Intake/Output Summary (Last 24 hours) at 6/9/2025 1357  Last data filed at 6/9/2025 0300  Gross per 24 hour   Intake --   Output 400 ml   Net -400 ml        Laboratory  Recent Labs     06/08/25 2006 06/09/25  0054   WBC 12.0* 10.2   RBC 4.01* 3.95*   HEMOGLOBIN 11.3* 11.2*   HEMATOCRIT 35.8* 35.0*   MCV 89.3 88.6   MCH 28.2 28.4   MCHC 31.6* 32.0*   RDW 45.3 45.1   PLATELETCT 203 187   MPV 10.3 10.8     Recent Labs     06/08/25 2006 06/09/25  0054   SODIUM 139 139   POTASSIUM 4.1 3.7   CHLORIDE 107 106   CO2 22 21   GLUCOSE 110* 159*   BUN 17 15   CREATININE 0.90 0.86   CALCIUM 9.1 8.8                   Imaging  CT-HIP W/O PLUS RECONS LEFT   Final Result         1.  Mildly displaced fracture through the left femoral metaphysis and neck.   2.  Atherosclerosis.         DX-FEMUR-2+ LEFT   Final Result         1.  Subtle proximal femoral metaphyseal fracture.      DX-CHEST-PORTABLE (1 VIEW)   Final Result      Patchy left basilar opacities, atelectasis or infection.      DX-HIP-COMPLETE - UNILATERAL 2+ LEFT   Final Result      1.  Findings concerning for nondisplaced intertrochanteric LEFT proximal femur fracture.   2.  Possible nondisplaced RIGHT femoral neck fracture.   3.  Diffuse osteopenia.   4.  Possible RIGHT femoral neck fracture.      DX-PORTABLE FLUOROSCOPY < 1 HOUR Reason For Exam: Main OR    (Results Pending)   DX-HIP-UNILATERAL-W/O PELVIS-2/3 VIEWS LEFT    (Results Pending)        Assessment/Plan  * Femur fracture (HCC)  Assessment & Plan  Noted to have a mechanical fall and landed on her left hip, barely able to bear weight  after  Orthopedic surgery consulted, CT hip confirms mildly displaced fracture  NPO, surgery today  Fluids  Pain medication    ACP (advance care planning)  Assessment & Plan  17 minutes spent discussing goals of care with patient.  She previously had not discussed CODE STATUS in the past.  I went over the process of CPR and inquired about what she would want in the event of a clinical deterioration and cardiac arrest.  Patient states that she has lived a long life and is happy with how she has lived.  Her  passed away over 10 years ago and she lives alone with her dogs.  Patient will be DNR/DNI    Encounter for preoperative assessment  Assessment & Plan  Admit to:    Orthopedic/Med-Surg floor since no major co-morbidities.     Cardiovascular:   Patient does not have history of CHF  Pre-op EKG: Yes    Pulmonary:  Oxygen per protocol    GI:   No history of cirrhosis. Standard bowel regimen. Hold for loose stools.    Renal:   IV fluids: -150 mL/hr for 2 days   Labs: Metabolic Panel with AM labs    Musculoskeletal:   Check 25 OH vitamin D level. If 31-40 pg/mL, consider starting vitamin D3 1000 IU PO daily. If 20-30 pg/mL, consider vitamin D3 2000 IU PO daily. If <20 pg/mL, vitamin D2 50,000 IU weekly x 8 weeks then 2000 IU PO daily.      Hematologic:  Plan on pharmacologic DVT prophylaxis post operative day #1. Hold for decreasing hemoglobin. Notify provider for hemoglobin less than 8.  Order preoperative type and cross.   Hgb every 6 hours if high bleeding risk.   If patient was on anticoagulation prior to arrival risks and benefits will be weighed by teams including surgery, hospitalist, geriatrics, and anesthesia for delaying surgery more than 24 hours.   On anticoagulation prior to arrival: No    Medical Assessment Risk:  Intermediate    Surgical Risk:   Intermediate      Hypertension  Assessment & Plan  Resume home meds    Hyperlipidemia  Assessment & Plan  Lipitor          VTE prophylaxis: VTE  Selection    I have performed a physical exam and reviewed and updated ROS and Plan today (6/9/2025). In review of yesterday's note (6/8/2025), there are no changes except as documented above.

## 2025-06-09 NOTE — ED TRIAGE NOTES
Pt bib ems from home.  Chief Complaint   Patient presents with    Hip Pain    GLF     Pt was mopping, slip and fall on wet floor. CC left hip pain. +CMS. Denies hitting head. Had to crawl thru house to call ems.

## 2025-06-09 NOTE — PROGRESS NOTES
4 Eyes Skin Assessment Completed by Derrick, RN and Ty RN.    Skin assessment is primarily focused on high risk bony prominences. Pay special attention to skin beneath and around medical devices, high risk bony prominences, skin to skin areas and areas where the patient lacks sensation to feel pain and areas where the patient previously had breakdown.     Head (Occipital):  WDL   Ears (Under Medical Devices): WDL   Nose (Under Medical Devices): WDL   Mouth:  WDL   Neck: WDL   Breast/Chest:  WDL   Shoulder Blades:  WDL   Spine:   WDL. Healed scar on the back from previous surgeries.    (R) Arm/Elbow/Hand: Red and Blanching   (L) Arm/Elbow/Hand: Red and Blanching   Abdomen: WDL   Pannus/Groin:  WDL   Sacrum/Coccyx:   WDL   (R) Ischial Tuberosity (Sit Bones):  WDL   (L) Ischial Tuberosity (Sit Bones):  WDL   (R) Leg:  WDL   (L) Leg:  WDL   (R) Heel:  WDL   (R) Foot/Toe: WDL   (L) Heel: WDL   (L) Foot/Toe:  WDL       DEVICES IN USE:   Respiratory Devices:  Nasal cannula and Pulse ox  Feeding Devices:  N/A   Lines & BP Monitoring Devices:  Peripheral IV and Pulse ox    Orthopedic Devices:  N/A  Miscellaneous Devices:  Purewick and SCDs    PROTOCOL INTERVENTIONS:   Standard/Trauma Bed:  Already in place  Glide Sheet:  Applied this assessment  Barrier Paste:  Applied this assessment  Condom Cath/Purewick:  Applied this assessment  Silicone Nasal Cannula Tubing:  Applied this assessment  Nasal Cannula with Gray Foams:  Applied this assessment    WOUND PHOTOS:   N/A no wounds identified    WOUND CONSULT:   N/A, no advanced wound care needs identified

## 2025-06-09 NOTE — ED NOTES
Med Rec complete per patient with med list   Allergies reviewed  Antibiotics in the past 30 days:no  Anticoagulant in past 14 days:no  Pharmacy patient utilizes:[facility] in [place]    Patient is unsure if she is still taking Lisinopril or if she is taking she is unsure of the strength of Lisinopril (not included on med rec) pharmacy is closed at this time, unable to verify Lisinopril.     Patient states she takes all of her medications in the evening

## 2025-06-09 NOTE — ED PROVIDER NOTES
ED Provider Note    CHIEF COMPLAINT  Chief Complaint   Patient presents with    Hip Pain    GLF     HPI    Kristen Collado is a 85 y.o. female who presents to the Emergency Department with left hip pain in the setting of a fall.  She was mopping the floor and noted a spot that she had missed.  She stepped forward to get this and her feet slipped out from under her on the wet tile floor.  She landed completely on her left hip.  She denies any head strike or loss of consciousness.  She denies any neck pain, chest pain, shortness of breath.  Denies any preceding dizziness.    REVIEW OF SYSTEMS  See HPI for further details. All other systems are negative.     PAST MEDICAL HISTORY   Past Medical History[1]    SURGICAL HISTORY  Past Surgical History[2]    FAMILY HISTORY  Family History   Problem Relation Age of Onset    Diabetes Unknown     Heart Disease Unknown     Hypertension Unknown     Stroke Unknown     Cancer Unknown        SOCIAL HISTORY    reports that she has quit smoking. She has a 35 pack-year smoking history. She does not have any smokeless tobacco history on file. She reports that she does not drink alcohol and does not use drugs.    CURRENT MEDICATIONS  Home Medications       Reviewed by Uzair Jones (Pharmacy Tech) on 06/08/25 at 2044  Med List Status: Complete     Medication Last Dose Status   amLODIPine (NORVASC) 5 MG Tab 6/7/2025 Active   atorvastatin (LIPITOR) 20 MG TABS 6/7/2025 Active   DULoxetine (CYMBALTA) 30 MG Cap DR Particles 6/7/2025 Active   JARDIANCE 25 MG Tab 6/7/2025 Active   meloxicam (MOBIC) 15 MG tablet 6/7/2025 Active   omeprazole (PRILOSEC) 20 MG CPDR 6/7/2025 Active   traZODone (DESYREL) 50 MG Tab 6/7/2025 Active                  Audit from Redirected Encounters    **Home medications have not yet been reviewed for this encounter**         ALLERGIES  Allergies[3]    PHYSICAL EXAM  VITAL SIGNS: BP (!) 205/90   Pulse 76   Temp 36.7 °C (98 °F) (Temporal)   Resp 16   Ht  "1.676 m (5' 6\")   Wt 79.4 kg (175 lb)   SpO2 92%   BMI 28.25 kg/m²   Gen: Alert, oriented  HENT: No racoon eyes septal hematoma, facial instability  Eye: EOMI, no chemosis, PERRL  Neck: trachea midline, no tenderness  Resp: no respiratory distress, no chest wall tenderness or crepitus.  Clear to auscultation bilaterally  CV: No JVD, RRR.  + peripheral pulses.  No murmurs, rubs, gallops  Abd: soft, non-distended, non-tender. No ecchymosis  Back: no spinal tenderness or deformities  Ext: Tenderness to left hip with exquisite pain with any movement of left lower extremity.  Otherwise no deformities, tenderness or edema.  Distal CSM intact.  Psych: normal mood  Neuro: speech fluent, moves all extremities. GCS 15    DIAGNOSTIC STUDIES / PROCEDURES  LABS  Labs Reviewed   CBC WITH DIFFERENTIAL - Abnormal; Notable for the following components:       Result Value    WBC 12.0 (*)     RBC 4.01 (*)     Hemoglobin 11.3 (*)     Hematocrit 35.8 (*)     MCHC 31.6 (*)     Neutrophils-Polys 85.30 (*)     Lymphocytes 7.60 (*)     Neutrophils (Absolute) 10.27 (*)     Lymphs (Absolute) 0.91 (*)     All other components within normal limits   COMP METABOLIC PANEL - Abnormal; Notable for the following components:    Glucose 110 (*)     Alkaline Phosphatase 144 (*)     All other components within normal limits   ESTIMATED GFR     RADIOLOGY  I have independently interpreted the diagnostic imaging associated with this visit.  My preliminary interpretation is as follows: X-ray hip: Nondisplaced left proximal femur fracture  Radiologist interpretation:    DX-FEMUR-2+ LEFT   Final Result         1.  Subtle proximal femoral metaphyseal fracture.      DX-CHEST-PORTABLE (1 VIEW)   Final Result      Patchy left basilar opacities, atelectasis or infection.      DX-HIP-COMPLETE - UNILATERAL 2+ LEFT   Final Result      1.  Findings concerning for nondisplaced intertrochanteric LEFT proximal femur fracture.   2.  Possible nondisplaced RIGHT femoral " neck fracture.   3.  Diffuse osteopenia.   4.  Possible RIGHT femoral neck fracture.      CT-HIP W/O PLUS RECONS LEFT    (Results Pending)       COURSE & MEDICAL DECISION MAKING  Pertinent Labs & Imaging studies were reviewed. (See chart for details)    EXTERNAL RECORDS REVIEWED  Outpatient Notes most recent outpatient note 12/1/2023 for cervical radiculopathy with neurosurgery      INITIAL ASSESSMENT AND PLAN  Care Narrative: Patient presents with nonsyncopal fall with clinical evidence of left hip fracture.  The radiologist was concerned about the right hip but the patient has no pain with active range of motion of right hip and reports that she was able to bear weight on as she transferred for EMS.  At this point in time no clinical evidence of right hip fracture.  After discussion with orthopedics, patient undergo CT of the left hip for surgical planning.-Iliac a block performed.  Some elevated blood pressure Emergency Department, will monitor for need for intervention.    8:23 PM- I spoke to Dr. Neely (ortho) who is requesting CT of the hip.     8:35 PM- I spoke to Dr. Edward (Hospitalist) about plan of admission. He was agreeable.     ADDITIONAL PROBLEM LIST AND DISPOSITION  Fascia Iliaca Block  Indication: Hip pain  Informed consent was obtained including risks, benefits, and alternatives. The patient was placed in an appropriate position.  Left LE was marked prior to procedure. Pt was prepped with chlorhexadine. Using sterile technique under real time US guidance, the femoral nerve located. Lateral to the nerve 40 ml of ropivacaine 0.2% injected after aspiration with appropriate hydrodissection of the fascia iliaca. Pre and post motor and sensory exams fully intact bilaterally.  Time and nerve block status marked post procedure. The patient tolerated the procedure well and there were no immediate complications.   EBL: 0mL    Images retained in [facility] as below:      I have discussed management of the  patient with the following medical professionals: See above, below      Patient is referred to primary care provider for blood pressure, diabetes and all other preventative health services.  Patient was given return precautions, anticipatory guidance, and the opportunity ask questions prior to discharge    FINAL IMPRESSION  1. Closed fracture of left hip, initial encounter ([facility])        DISPOSITION:    Case discussed with Dr. Edward , who will evaluate the patient for hospitalization. Patient will be hospitalized in guarded condition.      This dictation was created using voice recognition software. The accuracy of the dictation is limited to the abilities of the software. I expect there may be some errors of grammar and possibly content. The nursing notes were reviewed and certain aspects of this information were incorporated into this note.         [1]   Past Medical History:  Diagnosis Date    Arthritis     Dental disorder     upper and lower dentures    Diabetes 2002    oral agents    Hypertension     Pain     back and neck    Psychiatric problem     depression    Unspecified disorder of thyroid     hypothyroid   [2]   Past Surgical History:  Procedure Laterality Date    GASTRIC BYPASS LAPAROSCOPIC  10/16/2012    Performed by Sanchez Osuna M.D. at SURGERY [facility]    BLADDER SLING FEMALE  3/3/2009    Performed by WEST GOLDBERG at SURGERY SAME DAY [facility]    CYSTOSCOPY  3/3/2009    Performed by WEST GOLDBERG at SURGERY SAME DAY [facility]    HYSTERECTOMY, TOTAL ABDOMINAL  1984    TONSILLECTOMY  1948    APPENDECTOMY  1947    CERVICAL DISK AND FUSION ANTERIOR  1995,1996    total four surgeries    DILATION AND CURETTAGE  1961, 1983    D & C    LUMBAR DECOMPRESSION  1997, 2010    LUMBAR FUSION ANTERIOR  1996, 2010    LUMBAR FUSION POSTERIOR  1995, 1997, 2010   [3] No Known Allergies

## 2025-06-09 NOTE — ASSESSMENT & PLAN NOTE
ORIF of left femur fracture with Dr. Neely 6/9.  She is to be weightbearing as tolerated left lower leg, she will need to be on DVT prophylaxis aspirin 81 mg twice daily for 4 weeks and follow-up in clinic 2 weeks.  PT OT  As needed pain medication, added Toradol

## 2025-06-09 NOTE — TREATMENT PLAN
AP pelvis and lateral L hip xr reviewed, possible nondisplaced L IT fx.  Lucency at R FN likely artifact, per ED pt has no right sided pain.  Recommend medicine admit, CT pelvis, Xr L femur, and npo mn for possible surgery tomorrow pending results.

## 2025-06-09 NOTE — PROGRESS NOTES
2130 6/8/25 : Pt. Was transported to the floor via  gurney. Pt. Was A&Ox4, on RA. No signs and symptoms of distress.  Pt. Was transferred to bed safely, belongings and call light within reach.      0211 6/9/25/: Pt. Was transported via hospital bed for CT scan of the LLE. Pt. Was A&Ox4 on 1.5L of oxygen via NC.     0240 6/9/25 Pt. Was transported t back to her room, Pt. was A&Ox4, on 1.5L of oxygen via NC. No signs and symptoms of distress.  Pt. Was transported safely, belongings and call light within reach.

## 2025-06-10 LAB
GLUCOSE BLD STRIP.AUTO-MCNC: 117 MG/DL (ref 65–99)
GLUCOSE BLD STRIP.AUTO-MCNC: 173 MG/DL (ref 65–99)

## 2025-06-10 PROCEDURE — 97535 SELF CARE MNGMENT TRAINING: CPT

## 2025-06-10 PROCEDURE — 700102 HCHG RX REV CODE 250 W/ 637 OVERRIDE(OP): Performed by: STUDENT IN AN ORGANIZED HEALTH CARE EDUCATION/TRAINING PROGRAM

## 2025-06-10 PROCEDURE — 700105 HCHG RX REV CODE 258: Performed by: ORTHOPAEDIC SURGERY

## 2025-06-10 PROCEDURE — 700111 HCHG RX REV CODE 636 W/ 250 OVERRIDE (IP): Performed by: ORTHOPAEDIC SURGERY

## 2025-06-10 PROCEDURE — 97162 PT EVAL MOD COMPLEX 30 MIN: CPT

## 2025-06-10 PROCEDURE — 99232 SBSQ HOSP IP/OBS MODERATE 35: CPT | Performed by: INTERNAL MEDICINE

## 2025-06-10 PROCEDURE — 82962 GLUCOSE BLOOD TEST: CPT

## 2025-06-10 PROCEDURE — 82962 GLUCOSE BLOOD TEST: CPT | Performed by: INTERNAL MEDICINE

## 2025-06-10 PROCEDURE — 700111 HCHG RX REV CODE 636 W/ 250 OVERRIDE (IP): Mod: JZ | Performed by: INTERNAL MEDICINE

## 2025-06-10 PROCEDURE — 770001 HCHG ROOM/CARE - MED/SURG/GYN PRIV*

## 2025-06-10 PROCEDURE — A9270 NON-COVERED ITEM OR SERVICE: HCPCS | Performed by: STUDENT IN AN ORGANIZED HEALTH CARE EDUCATION/TRAINING PROGRAM

## 2025-06-10 PROCEDURE — 97166 OT EVAL MOD COMPLEX 45 MIN: CPT

## 2025-06-10 RX ORDER — ENOXAPARIN SODIUM 100 MG/ML
40 INJECTION SUBCUTANEOUS DAILY
Status: DISCONTINUED | OUTPATIENT
Start: 2025-06-10 | End: 2025-06-11 | Stop reason: HOSPADM

## 2025-06-10 RX ORDER — KETOROLAC TROMETHAMINE 15 MG/ML
15 INJECTION, SOLUTION INTRAMUSCULAR; INTRAVENOUS EVERY 6 HOURS PRN
Status: DISCONTINUED | OUTPATIENT
Start: 2025-06-10 | End: 2025-06-11 | Stop reason: HOSPADM

## 2025-06-10 RX ADMIN — ENOXAPARIN SODIUM 40 MG: 100 INJECTION SUBCUTANEOUS at 17:49

## 2025-06-10 RX ADMIN — INSULIN LISPRO 1 UNITS: 100 INJECTION, SOLUTION INTRAVENOUS; SUBCUTANEOUS at 08:49

## 2025-06-10 RX ADMIN — CEFAZOLIN 2 G: 2 INJECTION, POWDER, FOR SOLUTION INTRAVENOUS at 15:04

## 2025-06-10 RX ADMIN — KETOROLAC TROMETHAMINE 15 MG: 15 INJECTION, SOLUTION INTRAMUSCULAR; INTRAVENOUS at 13:31

## 2025-06-10 RX ADMIN — CEFAZOLIN 2 G: 2 INJECTION, POWDER, FOR SOLUTION INTRAVENOUS at 04:52

## 2025-06-10 RX ADMIN — OXYCODONE 5 MG: 5 TABLET ORAL at 22:51

## 2025-06-10 RX ADMIN — DULOXETINE 30 MG: 30 CAPSULE, DELAYED RELEASE ORAL at 16:36

## 2025-06-10 RX ADMIN — AMLODIPINE BESYLATE 5 MG: 5 TABLET ORAL at 16:35

## 2025-06-10 RX ADMIN — ACETAMINOPHEN 650 MG: 325 TABLET ORAL at 08:45

## 2025-06-10 RX ADMIN — OMEPRAZOLE 20 MG: 20 CAPSULE, DELAYED RELEASE ORAL at 16:35

## 2025-06-10 RX ADMIN — ATORVASTATIN CALCIUM 20 MG: 20 TABLET, FILM COATED ORAL at 21:31

## 2025-06-10 RX ADMIN — OXYCODONE 2.5 MG: 5 TABLET ORAL at 11:43

## 2025-06-10 ASSESSMENT — PAIN DESCRIPTION - PAIN TYPE
TYPE: ACUTE PAIN;SURGICAL PAIN

## 2025-06-10 ASSESSMENT — COGNITIVE AND FUNCTIONAL STATUS - GENERAL
TURNING FROM BACK TO SIDE WHILE IN FLAT BAD: A LITTLE
MOBILITY SCORE: 18
WALKING IN HOSPITAL ROOM: A LITTLE
MOVING TO AND FROM BED TO CHAIR: A LITTLE
DRESSING REGULAR UPPER BODY CLOTHING: A LITTLE
CLIMB 3 TO 5 STEPS WITH RAILING: A LITTLE
HELP NEEDED FOR BATHING: A LOT
SUGGESTED CMS G CODE MODIFIER DAILY ACTIVITY: CK
DAILY ACTIVITIY SCORE: 16
SUGGESTED CMS G CODE MODIFIER MOBILITY: CK
MOVING FROM LYING ON BACK TO SITTING ON SIDE OF FLAT BED: A LITTLE
DRESSING REGULAR LOWER BODY CLOTHING: A LOT
TOILETING: A LOT
STANDING UP FROM CHAIR USING ARMS: A LITTLE
PERSONAL GROOMING: A LITTLE

## 2025-06-10 ASSESSMENT — ENCOUNTER SYMPTOMS
ABDOMINAL PAIN: 0
COUGH: 0
MYALGIAS: 1
WEAKNESS: 1
SHORTNESS OF BREATH: 0

## 2025-06-10 ASSESSMENT — ACTIVITIES OF DAILY LIVING (ADL): TOILETING: INDEPENDENT

## 2025-06-10 ASSESSMENT — GAIT ASSESSMENTS
ASSISTIVE DEVICE: FRONT WHEEL WALKER
GAIT LEVEL OF ASSIST: STANDBY ASSIST
DEVIATION: ANTALGIC;BRADYKINETIC
DISTANCE (FEET): 80

## 2025-06-10 NOTE — DISCHARGE PLANNING
Case Management Discharge Planning    Admission Date: 6/8/2025  GMLOS: 3.1  ALOS: 2    6-Clicks ADL Score: 15  6-Clicks Mobility Score: 6  PT and/or OT Eval ordered: Yes  Post-acute Referrals Ordered: Yes  Post-acute Choice Obtained: Yes  Has referral(s) been sent to post-acute provider:  Yes      Anticipated Discharge Dispo: Discharge Disposition: D/T to home under HHA care in anticipation of covered skilled care (06)    DME Needed: No    Action(s) Taken: Patient was discussed in morning rounds.  Per MD, patient is medically cleared.  Pending PT/OT evaluations.  Per MD, patient does not want post acute placement and home health order will be placed.      1030  RNCM met with patient at the bedside to obtain choice for HH[facility]  [facility] #3 [facility]. RNCM faxed choice form to [facility].      1318  Laila  declined.  Referral sent to [facility].      Escalations Completed: None    Medically Clear: No    Next Steps: RN CM to continue to follow for DC planning      Barriers to Discharge: Medical clearance      Care Transition Team Assessment    RNCM met with patient at the bedside.  Patient is A&Ox4 and able to verify the information on the face sheet.  Patient lives in a single story home, with 1 step to enter.  Patient lives alone and able to care for self.  Pt's son lives in [place].  Patient's niece from [place] is able to come stay with the patient this weekend. Pt's neighbor is currently watching the patient's dog/cat while she is hospitalized.  Patient does not want to discharge to a facility since she has to get back to caring for her pets.  Pt does not have an AD.  Patient drives.  Patient stated she may have a ride home form the hospital from her neighbor or an Uber.  Pt has [employer] medicare.  Pt does not have an AD.  Pt's PCP is Dr Herrera uriostegui/  [facility].  Pt owns a FWW and uses it as needed when she leaves her home.  Pt does not use home oxygen.  Patient denies ETOH, drug use or  smoking.  Pt has never been to a SNF or IPR.  Patient has had home health and gone to outpatient therapy in the past.        Information Source  Orientation Level: Oriented X4  Information Given By: Patient  Who is responsible for making decisions for patient? : Patient    Readmission Evaluation  Is this a readmission?: No    Elopement Risk  Legal Hold: No  Ambulatory or Self Mobile in Wheelchair: No-Not an Elopement Risk  Elopement Risk: Not at Risk for Elopement    Interdisciplinary Discharge Planning  Lives with - Patient's Self Care Capacity: Alone and Unable to Care For Self  Patient or legal guardian wants to designate a caregiver: No  Support Systems: Friends / Neighbors  Housing / Facility: [address]    Discharge Preparedness  What is your plan after discharge?: Home health care  What are your discharge supports?: Other (comment), Child (neighbor, niece)  Prior Functional Level: Ambulatory, Independent with Activities of Daily Living, Uses Walker  Difficulity with ADLs: None  Difficulity with IADLs: None    Functional Assesment  Prior Functional Level: Ambulatory, Independent with Activities of Daily Living, Uses Walker    Finances  Financial Barriers to Discharge: No  Prescription Coverage: Yes    Vision / Hearing Impairment  Right Eye Vision: Impaired, Wears Glasses  Left Eye Vision: Impaired, Wears Glasses         Advance Directive  Advance Directive?: None  Advance Directive offered?: AD Booklet refused    Domestic Abuse  Have you ever been the victim of abuse or violence?: No  Possible Abuse/Neglect Reported to:: Not Applicable    Psychological Assessment  History of Substance Abuse: None  History of Psychiatric Problems: No  Non-compliant with Treatment: No  Newly Diagnosed Illness: Yes    Discharge Risks or Barriers  Discharge risks or barriers?: Complex medical needs  Patient risk factors: Lack of outside supports    Anticipated Discharge Information  Discharge Disposition: D/T to home under A  care in anticipation of covered skilled care (06)

## 2025-06-10 NOTE — CARE PLAN
The patient is Stable - Low risk of patient condition declining or worsening    Shift Goals  Clinical Goals: pain control, mobility, safety  Patient Goals: pain control, rest  Family Goals: NA    Progress made toward(s) clinical / shift goals:  yes  Problem: Pain - Standard  Goal: Alleviation of pain or a reduction in pain to the patient’s comfort goal  6/10/2025 0147 by Shana Ibarra R.N.  Outcome: Progressing  6/10/2025 0147 by Shana Ibarra R.N.  Outcome: Progressing     Problem: Mobility  Goal: Patient's capacity to carry out activities will improve  Outcome: Progressing     Problem: Self Care  Goal: Patient will have the ability to perform ADLs independently or with assistance (bathe, groom, dress, toilet and feed)  Outcome: Progressing     Problem: Wound/ / Incision Healing  Goal: Patient's wound/surgical incision will decrease in size and heals properly  Outcome: Progressing       Patient is not progressing towards the following goals:

## 2025-06-10 NOTE — THERAPY
"Physical Therapy   Initial Evaluation     Patient Name:  Kristen Collado  Age:  85 y.o., Sex:  female  Medical Record #:  [id number]  Today's Date: 6/10/2025     Precautions  Weight Bearing: (P) Weight Bearing as Tolerated Left Lower Extremity    Assessment    85 y.o. female was admitted s/p fall with a R hip intertrochanteric fracture.  She is now s/p ORIF and cleared for WBAT.  PMHx includes HTN, DM, diffuse osteopenia, cervical and lumbar fusions, and gastric bypass.  She lives alone with 2 pets in a 1SH with 2 FAVIOLA and was independent for mobility without an AD.  On eval, she presents with pain, weakness, and decreased activity tolerance impairing her mobility.  She will benefit from continued acute PT services to address the above deficits in order to return home safely.  Recommend home health PT services.    Plan    Physical Therapy Initial Treatment Plan   Treatment Plan : (P) Bed Mobility, Equipment, Family / Caregiver Training, Gait Training, Manual Therapy, Neuro Re-Education / Balance, Self Care / Home Evaluation, Stair Training, Therapeutic Activities, Therapeutic Exercise  Treatment Frequency: (P) 5 Times per Week  Duration: (P) Until Therapy Goals Met    DC Equipment Recommendations: (P) None (Pt has a standard walker at home)  Discharge Recommendations: (P) Recommend home health for continued physical therapy services       Subjective    \"I'm so glad to see you.  I want to get up.\"     Objective       06/10/25 1119   Initial Contact Note    Initial Contact Note Order Received and Verified, Physical Therapy Evaluation in Progress with Full Report to Follow.   Precautions   Weight Bearing Weight Bearing as Tolerated Left Lower Extremity   Vitals   Pulse 85  (supine, 85 sitting)   Blood Pressure  127/64  (supine, 138/50 sitting)   Pulse Oximetry 97 %  (on 2L, 85% on RA, 92% on 1L with activity)   Pain 0 - 10 Group   Therapist Pain Assessment During Activity  (L hip, relieved by rest)   Prior Living " "Situation   Housing / Facility 1 Story House   Steps Into Home 2  (~3\")   Rail None   Bathroom Set up Walk In Shower;Grab Bars   Equipment Owned 4-Wheel Walker;Single Point Cane;Tub / Shower Seat;Bed Side Commode  (standard walker)   Lives with - Patient's Self Care Capacity Alone and Able to Care For Self   Comments has a dog and cat- well- behaved; Niece is coming this weekend and can stay for 2-3 weeks   Prior Level of Functional Mobility   Bed Mobility Independent   Transfer Status Independent   Ambulation Independent   Ambulation Distance community   Assistive Devices Used None   Stairs Independent   Comments drives, doesn't work   History of Falls   History of Falls Yes   Date of Last Fall 06/08/25  (Reason for admission)   Cognition    Cognition / Consciousness WDL   Level of Consciousness Alert   Active ROM Upper Body   Comments WFL for mobility   Strength Upper Body   Comments WFL for mobility   Active ROM Lower Body    Active ROM Lower Body  WDL   Strength Lower Body   Lower Body Strength  X   Comments L hip grossly 4-/5, pain with over pressure   Sensation Lower Body   Lower Extremity Sensation   WDL   Coordination Lower Body    Coordination Lower Body  WDL   Balance Assessment   Sitting Balance (Static) Fair +   Sitting Balance (Dynamic) Fair   Standing Balance (Static) Fair -   Standing Balance (Dynamic) Fair -   Weight Shift Sitting Fair   Weight Shift Standing Fair   Comments with FWW   Bed Mobility    Supine to Sit Standby Assist   Scooting Standby Assist   Rolling Standby Assist   Comments bed falt, rail, increased time   Gait Analysis   Gait Level Of Assist Standby Assist   Assistive Device Front Wheel Walker   Distance (Feet) 80   # of Times Distance was Traveled 1   Deviation Antalgic;Bradykinetic   Weight Bearing Status WBAT LLE   Comments L knee bent with R knee straight in standing.  Pt reports a chronic leg length discrepancy.  PT educated about a heel lift.   Functional Mobility   Sit to " Stand Standby Assist  (VCs hand placement)   Bed, Chair, Wheelchair Transfer Standby Assist   Transfer Method Stand Step   Mobility with FWW   Activity Tolerance   Sitting in Chair post session   Edema / Skin Assessment   Edema / Skin  Not Assessed   Short Term Goals    Short Term Goal # 1 Pt will perform supine < > sit SPV with bed flat, no rail in 6 visits in order to set up for upright mobility   Short Term Goal # 2 Pt will perform sit < > stand SPV in 6 visits in order to prepare for ambulation   Short Term Goal # 3 Pt will ambulate 150' SPV /c FWW in 6 visits in order to return home safely   Short Term Goal # 4 Pt will ascend/ descend 2 steps SPV in 6 visits in order to access her home   Education Group   Education Provided Role of Physical Therapist;Gait Training;Transfer Status;Weight Bearing Status   Role of Physical Therapist Patient Response Patient;Acceptance;Explanation;Action Demonstration   Gait Training Patient Response Patient;Acceptance;Explanation;Action Demonstration;Reinforcement Needed   Transfer Status Patient Response Patient;Acceptance;Explanation;Action Demonstration;Reinforcement Needed   Weight Bearing Status Patient Response Patient;Acceptance;Explanation;Action Demonstration   Physical Therapy Initial Treatment Plan    Treatment Plan  Bed Mobility;Equipment;Family / Caregiver Training;Gait Training;Manual Therapy;Neuro Re-Education / Balance;Self Care / Home Evaluation;Stair Training;Therapeutic Activities;Therapeutic Exercise   Treatment Frequency 5 Times per Week   Duration Until Therapy Goals Met   Problem List    Problems Impaired Bed Mobility;Impaired Transfers;Impaired Ambulation;Functional Strength Deficit;Decreased Activity Tolerance   Anticipated Discharge Equipment and Recommendations   DC Equipment Recommendations None  (Pt has a standard walker at home)   Discharge Recommendations Recommend home health for continued physical therapy services   Interdisciplinary Plan of Care  Collaboration   IDT Collaboration with  Nursing   Patient Position at End of Therapy Seated;Chair Alarm On;Call Light within Reach;Tray Table within Reach   Collaboration Comments RN updated   Session Information   Date / Session Number  6/10 -1 (1/5, 6/16)   Priority   (hip fx)

## 2025-06-10 NOTE — PROGRESS NOTES
Hospital Medicine Daily Progress Note    Date of Service  6/10/2025    Chief Complaint  Kristen Collado is a 85 y.o. female admitted 6/8/2025 with fall    Hospital Course  86 yo woman with DM, HTN, HLD who had fallen while cleaning her bathroom and found a nondisplaced intertrochanteric left proximal femur fracture, possible nondisplaced right femoral neck fracture, diffuse osteopenia. She underwent ORIF of left femur fracture with Dr. Neely 6/9.  She is to be weightbearing as tolerated left lower leg, she will need to be on DVT prophylaxis aspirin 81 mg twice daily for 4 weeks and follow-up in clinic 2 weeks.    Interval Problem Update  Patient has a lot of pain to her hip with movement.  Did work with therapy which exacerbated her pain and I ordered for IV Toradol.  Patient does want to go home after discharge.  Working on weaning oxygen as well    I have discussed this patient's plan of care and discharge plan at IDT rounds today with Case Management, Nursing, Nursing leadership, and other members of the IDT team.    Consultants/Specialty  orthopedics    Code Status  DNAR/DNI    Disposition  The patient is not medically cleared for discharge to home or a post-acute facility.  Anticipate discharge to: home with organized home healthcare and close outpatient follow-up    I have placed the appropriate orders for post-discharge needs.    Review of Systems  Review of Systems   Respiratory:  Negative for cough and shortness of breath.    Cardiovascular:  Negative for chest pain.   Gastrointestinal:  Negative for abdominal pain.   Musculoskeletal:  Positive for joint pain and myalgias.   Neurological:  Positive for weakness.        Physical Exam  Temp:  [36.1 °C (97 °F)-36.8 °C (98.3 °F)] 36.4 °C (97.5 °F)  Pulse:  [72-97] (P) 85  Resp:  [11-19] 12  BP: (107-168)/(54-81) (P) 127/64  SpO2:  [90 %-100 %] (P) 97 %    Physical Exam  Vitals and nursing note reviewed.   Constitutional:       General: She is not in  acute distress.     Appearance: She is not toxic-appearing.   HENT:      Head: Normocephalic.      Mouth/Throat:      Mouth: Mucous membranes are moist.   Eyes:      General:         Right eye: No discharge.         Left eye: No discharge.   Cardiovascular:      Rate and Rhythm: Normal rate and regular rhythm.   Pulmonary:      Effort: Pulmonary effort is normal. No respiratory distress.      Breath sounds: No wheezing or rales.   Abdominal:      Palpations: Abdomen is soft.      Tenderness: There is no abdominal tenderness.   Musculoskeletal:         General: No swelling.      Cervical back: Neck supple.      Comments: Left hip flexion limited by pain   Skin:     General: Skin is warm and dry.   Neurological:      Mental Status: She is alert and oriented to person, place, and time.         Fluids    Intake/Output Summary (Last 24 hours) at 6/10/2025 1452  Last data filed at 6/10/2025 0600  Gross per 24 hour   Intake 500 ml   Output 520 ml   Net -20 ml        Laboratory  Recent Labs     06/08/25 2006 06/09/25  0054   WBC 12.0* 10.2   RBC 4.01* 3.95*   HEMOGLOBIN 11.3* 11.2*   HEMATOCRIT 35.8* 35.0*   MCV 89.3 88.6   MCH 28.2 28.4   MCHC 31.6* 32.0*   RDW 45.3 45.1   PLATELETCT 203 187   MPV 10.3 10.8     Recent Labs     06/08/25 2006 06/09/25  0054   SODIUM 139 139   POTASSIUM 4.1 3.7   CHLORIDE 107 106   CO2 22 21   GLUCOSE 110* 159*   BUN 17 15   CREATININE 0.90 0.86   CALCIUM 9.1 8.8                   Imaging  DX-PORTABLE FLUOROSCOPY < 1 HOUR Reason For Exam: Main OR   Final Result      Portable fluoroscopy utilized for 34 seconds.      INTERPRETING LOCATION: [address] [place], [place] [address], [id number]      DX-HIP-UNILATERAL-W/O PELVIS-2/3 VIEWS LEFT   Final Result      Digitized intraoperative radiograph is submitted for review. This examination is not for diagnostic purpose but for guidance during a surgical procedure. Please see the patient's chart for full procedural details.         INTERPRETING LOCATION: [address] [place] [address],  [place], [id number]      CT-HIP W/O PLUS RECONS LEFT   Final Result         1.  Mildly displaced fracture through the left femoral metaphysis and neck.   2.  Atherosclerosis.         DX-FEMUR-2+ LEFT   Final Result         1.  Subtle proximal femoral metaphyseal fracture.      DX-CHEST-PORTABLE (1 VIEW)   Final Result      Patchy left basilar opacities, atelectasis or infection.      DX-HIP-COMPLETE - UNILATERAL 2+ LEFT   Final Result      1.  Findings concerning for nondisplaced intertrochanteric LEFT proximal femur fracture.   2.  Possible nondisplaced RIGHT femoral neck fracture.   3.  Diffuse osteopenia.   4.  Possible RIGHT femoral neck fracture.           Assessment/Plan  * Femur fracture (HCC)  Assessment & Plan  ORIF of left femur fracture with Dr. Neely 6/9.  She is to be weightbearing as tolerated left lower leg, she will need to be on DVT prophylaxis aspirin 81 mg twice daily for 4 weeks and follow-up in clinic 2 weeks.  PT OT  As needed pain medication, added Toradol    ACP (advance care planning)  Assessment & Plan  DNR    Encounter for preoperative assessment  Assessment & Plan  Admit to:    Orthopedic/Med-Surg floor since no major co-morbidities.     Cardiovascular:   Patient does not have history of CHF  Pre-op EKG: Yes    Pulmonary:  Oxygen per protocol    GI:   No history of cirrhosis. Standard bowel regimen. Hold for loose stools.    Renal:   IV fluids: -150 mL/hr for 2 days   Labs: Metabolic Panel with AM labs    Musculoskeletal:   Check 25 OH vitamin D level. If 31-40 pg/mL, consider starting vitamin D3 1000 IU PO daily. If 20-30 pg/mL, consider vitamin D3 2000 IU PO daily. If <20 pg/mL, vitamin D2 50,000 IU weekly x 8 weeks then 2000 IU PO daily.      Hematologic:  Plan on pharmacologic DVT prophylaxis post operative day #1. Hold for decreasing hemoglobin. Notify provider for hemoglobin less than 8.  Order preoperative type and cross.   Hgb every 6 hours if high bleeding risk.   If patient  was on anticoagulation prior to arrival risks and benefits will be weighed by teams including surgery, hospitalist, geriatrics, and anesthesia for delaying surgery more than 24 hours.   On anticoagulation prior to arrival: No    Medical Assessment Risk:  Intermediate    Surgical Risk:   Intermediate      Hypertension  Assessment & Plan  Initially hypertensive, improving.  Continue Norvasc    Hyperlipidemia  Assessment & Plan  Lipitor          VTE prophylaxis:    enoxaparin ppx      I have performed a physical exam and reviewed and updated ROS and Plan today (6/10/2025). In review of yesterday's note (6/9/2025), there are no changes except as documented above.

## 2025-06-10 NOTE — THERAPY
Occupational Therapy   Initial Evaluation     Patient Name:  Kristen Collado  Age:  85 y.o., Sex:  female  Medical Record #:  [id number]  Today's Date:  6/10/2025     Precautions  Medical: Fall Risk  Weight Bearing: Weight Bearing as Tolerated Left Lower Extremity    Assessment  Patient is 85 y.o. female admitted after GLF found to have L femur fx and possible R femur fx s/p L femur ORIF w/cephalomedullary implant. PMHX of multiple back sxs, HTN, DMII, and osteopenia. Reports lives alone, but her niece will be coming to stay with her for 2-3 wks to assist PRN; arrives Saturday. Pt reports until then she can ask her friends to assist intermittently. Currently limited by decreased functional mobility, activity tolerance, cognition, balance, and pain which are currently affecting pt's ability to complete ADLs/txfs/IADLs at baseline. Will continue to follow.     Plan    Occupational Therapy Initial Treatment Plan   Treatment Interventions: Self Care / Activities of Daily Living, Adaptive Equipment, Neuro Re-Education / Balance, Therapeutic Exercises, Therapeutic Activity, Family / Caregiver Training  Treatment Frequency: 5 Times per Week  Duration: Until Therapy Goals Met    DC Equipment Recommendations: Raised Toilet Seat with Arms  Discharge Recommendations: Recommend home health for continued occupational therapy services (pt will likely progress quickly once pain is controlled)      Objective     06/10/25 1540   Prior Living Situation   Prior Services Home-Independent   Housing / Facility 1 Story House   Steps Into Home 2  (or 3)   Bathroom Set up Walk In Shower;Grab Bars;Shower Chair   Equipment Owned 4-Wheel Walker;Single Point Cane;Tub / Shower Seat;Bed Side Commode;Grab Bar(s) In Tub / Shower;Sock Aid;Reacher  (standard walker)   Lives with - Patient's Self Care Capacity Alone and Able to Care For Self   Comments Reports lives alone, but her niece will be coming to stay with her for 2-3 wks to assist PRN;  arrives Saturday. Pt reports until then she can ask her friends to assist intermittently.   Prior Level of ADL Function   Self Feeding Independent   Grooming / Hygiene Independent   Bathing Independent   Dressing Independent   Toileting Independent   Prior Level of IADL Function   Medication Management Independent   Laundry Independent   Kitchen Mobility Independent   Finances Independent   Home Management Independent   Shopping Independent   Prior Level Of Mobility Independent Without Device in Home;Independent With Device in Community  (for longer distances will take 4ww)   Driving / Transportation Driving Independent   History of Falls   History of Falls Yes   Date of Last Fall 06/08/25  (reason for admit)   Precautions   Medical Fall Risk   Weight Bearing Weight Bearing as Tolerated Left Lower Extremity   Vitals   O2 (LPM) 2   O2 Delivery Device Silicone Nasal Cannula   Vitals Comments desaturation on RA to mid 80%s; refused to use O2 while ambulating. req standing and seated RBs and redon of 2L O2 to recover   Pain 0 - 10 Group   Location Hip;Leg   Location Orientation Left   Therapist Pain Assessment During Activity;Nurse Notified  (not quantified)   Cognition    Cognition / Consciousness X   Level of Consciousness Alert   New Learning Impaired   Attention Impaired   Comments very pleasant and cooperative; loquacious and tangential req redirection. receptive to education, but questioned her ability to remember; recommend reinforcement   Passive ROM Upper Body   Passive ROM Upper Body WDL   Active ROM Upper Body   Active ROM Upper Body  WDL   Strength Upper Body   Upper Body Strength  WDL   Balance Assessment   Sitting Balance (Static) Fair +   Sitting Balance (Dynamic) Fair   Standing Balance (Static) Fair   Standing Balance (Dynamic) Fair -   Weight Shift Sitting Fair   Weight Shift Standing Fair   Comments w/FWW   Bed Mobility    Supine to Sit Standby Assist   Sit to Supine Standby Assist   Scooting  Supervised   Comments HOB elevated; reports will be sleeping in her recliner at home   ADL Assessment   Eating Supervision   Grooming   (declined need)   Lower Body Dressing Moderate Assist   Toileting Minimal Assist  (likely able to get to toilet)   Comments Educated on adaptive techniques for ADL/txfs, home safety, sitting with legs supported on surfaces, safety with FWW, pain mgmt, hook technique for moving BLEs, DME for home, and importance of continued OOB activity including getting to BR for toileting and to chair for meals.   Functional Mobility   Sit to Stand Contact Guard Assist   Bed, Chair, Wheelchair Transfer Contact Guard Assist   Toilet Transfers   (declined need; able to get to toilet)   Transfer Method Stand Step   Mobility w/FWW; in room and hallway   Edema / Skin Assessment   Edema / Skin  Not Assessed   Activity Tolerance   Comments limited by pain and fatigue   Patient / Family Goals   Patient / Family Goal #1 to go home   Short Term Goals   Short Term Goal # 1 LB dressing with SPV using AE PRN   Short Term Goal # 2 toilet txf with SPV   Short Term Goal # 3 standing g/h with SPV   Short Term Goal # 4 toileting w/SPV   Education Group   Education Provided Role of Occupational Therapist;Pathology of bedrest   Role of Occupational Therapist Patient Response Patient;Acceptance;Explanation;Verbal Demonstration;Reinforcement Needed   Pathology of Bedrest Patient Response Patient;Acceptance;Explanation;Verbal Demonstration;Reinforcement Needed   Occupational Therapy Initial Treatment Plan    Treatment Interventions Self Care / Activities of Daily Living;Adaptive Equipment;Neuro Re-Education / Balance;Therapeutic Exercises;Therapeutic Activity;Family / Caregiver Training   Treatment Frequency 5 Times per Week   Duration Until Therapy Goals Met   Problem List   Problem List Decreased Active Daily Living Skills;Decreased Homemaking Skills;Decreased Functional Mobility;Decreased Activity Tolerance;Impaired  Cognitive Function;Impaired Postural Control / Balance

## 2025-06-10 NOTE — ANESTHESIA POSTPROCEDURE EVALUATION
Patient: Kristen Collado    Procedure Summary       Date: 06/09/25 Room / Location: [place] [address] / SURGERY [facility]    Anesthesia Start: 1407 Anesthesia Stop: 1457    Procedure: INSERTION, INTRAMEDULLARY RUFINO, FEMUR, PROXIMAL (Left: Leg Upper) Diagnosis: (left intertrochanteric femur fracture)    Surgeons: Yair Neely M.D. Responsible Provider: Jonny Winkler D.O.    Anesthesia Type: general ASA Status: 2            Final Anesthesia Type: general  Last vitals  BP   Blood Pressure : (!) 148/71    Temp   36.1 °C (97 °F)    Pulse   85   Resp   16    SpO2   95 %      Anesthesia Post Evaluation    Patient location during evaluation: PACU  Patient participation: complete - patient participated  Level of consciousness: awake and alert    Airway patency: patent  Anesthetic complications: no  Cardiovascular status: hemodynamically stable  Respiratory status: acceptable  Hydration status: euvolemic    PONV: none          No notable events documented.     Nurse Pain Score: 8 (NPRS)

## 2025-06-10 NOTE — PROGRESS NOTES
"    Orthopedic PA Progress Note    Interval changes:  Patient doing well postop  L hip dressings are CDI  WBAT LLE  No pending ortho procedures  Cleared for DC from orthopedic standpoint pending therapy recs and medical optimization    ROS - Patient denies any new issues.  Denies any numbness or tingling. Pain controlled.    /54   Pulse 72   Temp 36.4 °C (97.5 °F) (Temporal)   Resp 12   Ht 1.676 m (5' 6\")   Wt 79.4 kg (175 lb)   SpO2 99%     Patient seen and examined  No acute distress  Breathing non labored  RRR  LLE: Surgical dressing is clean, dry, and intact. Patient clearly fires tibialis anterior, EHL, and gastrocnemius/soleus. Sensation is intact to light touch throughout superficial peroneal, deep peroneal, tibial, saphenous, and sural nerve distributions. Strong and palpable 2+ dorsalis pedis and posterior tibial pulses with capillary refill less than 2 seconds    Recent Labs     06/08/25 2006 06/09/25  0054   WBC 12.0* 10.2   RBC 4.01* 3.95*   HEMOGLOBIN 11.3* 11.2*   HEMATOCRIT 35.8* 35.0*   MCV 89.3 88.6   MCH 28.2 28.4   MCHC 31.6* 32.0*   RDW 45.3 45.1   PLATELETCT 203 187   MPV 10.3 10.8       Active Hospital Problems    Diagnosis     Hyperlipidemia [E78.5]     Hypertension [I10]     Encounter for preoperative assessment [Z01.818]     Femur fracture (HCC) [S72.90XA]     ACP (advance care planning) [Z71.89]        DX-PORTABLE FLUOROSCOPY < 1 HOUR Reason For Exam: Main OR   Final Result      Portable fluoroscopy utilized for 34 seconds.      INTERPRETING LOCATION: [address] [place] [address], [id number]      DX-HIP-UNILATERAL-W/O PELVIS-2/3 VIEWS LEFT   Final Result      Digitized intraoperative radiograph is submitted for review. This examination is not for diagnostic purpose but for guidance during a surgical procedure. Please see the patient's chart for full procedural details.         INTERPRETING LOCATION: [address] [place] [address], [id number]      CT-HIP W/O PLUS RECONS LEFT   Final Result         1.  " Mildly displaced fracture through the left femoral metaphysis and neck.   2.  Atherosclerosis.         DX-FEMUR-2+ LEFT   Final Result         1.  Subtle proximal femoral metaphyseal fracture.      DX-CHEST-PORTABLE (1 VIEW)   Final Result      Patchy left basilar opacities, atelectasis or infection.      DX-HIP-COMPLETE - UNILATERAL 2+ LEFT   Final Result      1.  Findings concerning for nondisplaced intertrochanteric LEFT proximal femur fracture.   2.  Possible nondisplaced RIGHT femoral neck fracture.   3.  Diffuse osteopenia.   4.  Possible RIGHT femoral neck fracture.          Assessment/Plan:  Patient doing well postop  L hip dressings are CDI  WBAT LLE  No pending ortho procedures  Cleared for DC from orthopedic standpoint pending therapy recs and medical optimization    POD#1 S/p  Open reduction internal fixation left intertrochanteric femur fracture with cephalomedullary implant  Wt bearing status - WBAT LLE  Future Procedures - None planned   Wound care/Drains - Dressings to be changed every other day by nursing. Or PRN for saturation starting POD#2  Sutures/Staples out- 14-21 days post operatively. Removal will completed by ortho JEFFRY's unless transferred.  DVT Prophylaxis outpatient: ASA 81 mg PO BID x4 weeks  PT/OT-initiated  Antibiotics:  Perioperative completed  DVT Prophylaxis- TEDS/SCDs/Foot pumps  Case Coordination for Discharge Planning - Disposition per therapy recs.   Follow up with Dr. Neely 2 weeks following final surgery for repeat imaging and wound check. (Est follow up date 6/23)

## 2025-06-10 NOTE — CARE PLAN
Problem: Pain - Standard  Goal: Alleviation of pain or a reduction in pain to the patient’s comfort goal  Outcome: Progressing     Problem: Mobility  Goal: Patient's capacity to carry out activities will improve  Outcome: Progressing       The patient is Stable - Low risk of patient condition declining or worsening    Shift Goals  Clinical Goals: (P) pain management, mobility, safety  Patient Goals: (P) pain control, movement, PT/OT  Family Goals: (P) not present    Progress made toward(s) clinical / shift goals:  Taught pt 0-10 pain scale and non-pharmacological method of pain management, encouraged to verbalize when in pain. Administered PRN pain meds as needed. Mobilizing x1-2 assist with FWW. Bed locked and in lowest position. Bed alarm on. Safety and fall precautions in place. Hourly rounding. Call light and belongings within reach.     Patient is not progressing towards the following goals:

## 2025-06-10 NOTE — DISCHARGE PLANNING
Received Choice form at 5687  Agency/Facility Name: [facility]   Referral sent per Choice form @ 0096

## 2025-06-11 ENCOUNTER — PHARMACY VISIT (OUTPATIENT)
Dept: PHARMACY | Facility: MEDICAL CENTER | Age: 85
End: 2025-06-11
Payer: COMMERCIAL

## 2025-06-11 VITALS
BODY MASS INDEX: 28.12 KG/M2 | HEART RATE: 73 BPM | DIASTOLIC BLOOD PRESSURE: 58 MMHG | WEIGHT: 175 LBS | OXYGEN SATURATION: 90 % | SYSTOLIC BLOOD PRESSURE: 136 MMHG | TEMPERATURE: 97.5 F | HEIGHT: 66 IN | RESPIRATION RATE: 15 BRPM

## 2025-06-11 LAB
ALBUMIN SERPL BCP-MCNC: 3.4 G/DL (ref 3.2–4.9)
ANION GAP SERPL CALC-SCNC: 8 MMOL/L (ref 7–16)
BUN SERPL-MCNC: 22 MG/DL (ref 8–22)
CALCIUM ALBUM COR SERPL-MCNC: 9.1 MG/DL (ref 8.5–10.5)
CALCIUM SERPL-MCNC: 8.6 MG/DL (ref 8.5–10.5)
CHLORIDE SERPL-SCNC: 104 MMOL/L (ref 96–112)
CO2 SERPL-SCNC: 23 MMOL/L (ref 20–33)
CREAT SERPL-MCNC: 0.89 MG/DL (ref 0.5–1.4)
GFR SERPLBLD CREATININE-BSD FMLA CKD-EPI: 64 ML/MIN/1.73 M 2
GLUCOSE SERPL-MCNC: 147 MG/DL (ref 65–99)
PHOSPHATE SERPL-MCNC: 2.7 MG/DL (ref 2.5–4.5)
POTASSIUM SERPL-SCNC: 4.2 MMOL/L (ref 3.6–5.5)
SODIUM SERPL-SCNC: 135 MMOL/L (ref 135–145)

## 2025-06-11 PROCEDURE — 700111 HCHG RX REV CODE 636 W/ 250 OVERRIDE (IP): Mod: JZ | Performed by: INTERNAL MEDICINE

## 2025-06-11 PROCEDURE — 97116 GAIT TRAINING THERAPY: CPT

## 2025-06-11 PROCEDURE — 97530 THERAPEUTIC ACTIVITIES: CPT

## 2025-06-11 PROCEDURE — 80069 RENAL FUNCTION PANEL: CPT

## 2025-06-11 PROCEDURE — A9270 NON-COVERED ITEM OR SERVICE: HCPCS | Performed by: STUDENT IN AN ORGANIZED HEALTH CARE EDUCATION/TRAINING PROGRAM

## 2025-06-11 PROCEDURE — 99239 HOSP IP/OBS DSCHRG MGMT >30: CPT | Performed by: INTERNAL MEDICINE

## 2025-06-11 PROCEDURE — RXMED WILLOW AMBULATORY MEDICATION CHARGE: Performed by: INTERNAL MEDICINE

## 2025-06-11 PROCEDURE — 700102 HCHG RX REV CODE 250 W/ 637 OVERRIDE(OP): Performed by: STUDENT IN AN ORGANIZED HEALTH CARE EDUCATION/TRAINING PROGRAM

## 2025-06-11 RX ORDER — SENNA AND DOCUSATE SODIUM 50; 8.6 MG/1; MG/1
1 TABLET, FILM COATED ORAL DAILY
Qty: 7 TABLET | Refills: 0 | Status: SHIPPED | OUTPATIENT
Start: 2025-06-11 | End: 2025-06-18

## 2025-06-11 RX ORDER — OXYCODONE HYDROCHLORIDE 5 MG/1
5 TABLET ORAL EVERY 6 HOURS PRN
Qty: 25 TABLET | Refills: 0 | Status: SHIPPED | OUTPATIENT
Start: 2025-06-11 | End: 2025-06-18

## 2025-06-11 RX ORDER — ASPIRIN 81 MG/1
81 TABLET, CHEWABLE ORAL 2 TIMES DAILY
Qty: 32 TABLET | Refills: 0 | Status: SHIPPED | OUTPATIENT
Start: 2025-06-11 | End: 2025-06-27

## 2025-06-11 RX ADMIN — TRAZODONE HYDROCHLORIDE 50 MG: 50 TABLET ORAL at 00:33

## 2025-06-11 RX ADMIN — KETOROLAC TROMETHAMINE 15 MG: 15 INJECTION, SOLUTION INTRAMUSCULAR; INTRAVENOUS at 11:54

## 2025-06-11 RX ADMIN — KETOROLAC TROMETHAMINE 15 MG: 15 INJECTION, SOLUTION INTRAMUSCULAR; INTRAVENOUS at 00:33

## 2025-06-11 RX ADMIN — OXYCODONE 5 MG: 5 TABLET ORAL at 14:44

## 2025-06-11 RX ADMIN — OXYCODONE 5 MG: 5 TABLET ORAL at 10:38

## 2025-06-11 ASSESSMENT — COGNITIVE AND FUNCTIONAL STATUS - GENERAL
WALKING IN HOSPITAL ROOM: A LITTLE
MOBILITY SCORE: 18
STANDING UP FROM CHAIR USING ARMS: A LITTLE
SUGGESTED CMS G CODE MODIFIER MOBILITY: CK
CLIMB 3 TO 5 STEPS WITH RAILING: A LITTLE
TURNING FROM BACK TO SIDE WHILE IN FLAT BAD: A LITTLE
MOVING TO AND FROM BED TO CHAIR: A LITTLE
MOVING FROM LYING ON BACK TO SITTING ON SIDE OF FLAT BED: A LITTLE

## 2025-06-11 ASSESSMENT — PAIN DESCRIPTION - PAIN TYPE
TYPE: ACUTE PAIN;SURGICAL PAIN
TYPE: ACUTE PAIN
TYPE: SURGICAL PAIN

## 2025-06-11 ASSESSMENT — GAIT ASSESSMENTS
DISTANCE (FEET): 20
GAIT LEVEL OF ASSIST: STANDBY ASSIST
DEVIATION: ANTALGIC;STEP TO;BRADYKINETIC
ASSISTIVE DEVICE: FRONT WHEEL WALKER

## 2025-06-11 NOTE — DISCHARGE SUMMARY
Discharge Summary    CHIEF COMPLAINT ON ADMISSION  Chief Complaint   Patient presents with    Hip Pain    GLF       Reason for Admission  ems     Admission Date  6/8/2025    CODE STATUS  DNAR/DNI    HPI & HOSPITAL COURSE  86 yo woman with DM, HTN, HLD who had fallen while cleaning her bathroom and found a nondisplaced intertrochanteric left proximal femur fracture, possible nondisplaced right femoral neck fracture, diffuse osteopenia. She underwent ORIF of left femur fracture with Dr. Neely 6/9.  She is to be weightbearing as tolerated left lower leg, she will need to be on DVT prophylaxis aspirin 81 mg twice daily for 4 weeks and follow-up in clinic 2 weeks.  Postop she was on oxygen and slowly weaned off to room air with ambulation and I-S.  She worked with PT OT and was set up with home health.    Therefore, she is discharged in good and stable condition to home with organized home healthcare and close outpatient follow-up.    The patient met 2-midnight criteria for an inpatient stay at the time of discharge.    Discharge Date  6/11    FOLLOW UP ITEMS POST DISCHARGE  Ortho 2 weeks    DISCHARGE DIAGNOSES  Principal Problem:    Femur fracture (HCC) (POA: Unknown)  Active Problems:    Hyperlipidemia (POA: Unknown)    Hypertension (POA: Unknown)    Encounter for preoperative assessment (POA: Unknown)    ACP (advance care planning) (POA: Unknown)  Resolved Problems:    History of femur fracture (POA: Yes)      FOLLOW UP  [address]  [facility] [address]  [phone]          MEDICATIONS ON DISCHARGE     Medication List        START taking these medications        Instructions   aspirin 81 MG Chew chewable tablet  Commonly known as: Asa   Chew 1 Tablet 2 times a day for 16 days.  Dose: 81 mg     oxyCODONE immediate-release 5 MG Tabs  Commonly known as: Roxicodone   Take 1 Tablet by mouth every 6 hours as needed for Severe Pain for up to 7 days.  Dose: 5 mg      sennosides-docusate sodium 8.6-50 MG tablet  Commonly known as: Senokot-S   Take 1 Tablet by mouth every day for 7 days.  Dose: 1 Tablet            CONTINUE taking these medications        Instructions   amLODIPine 5 MG Tabs  Commonly known as: Norvasc   Take 5 mg by mouth every evening.  Dose: 5 mg     DULoxetine 30 MG Cpep  Commonly known as: Cymbalta   Take 30 mg by mouth every evening.  Dose: 30 mg     Jardiance 25 MG Tabs  Generic drug: Empagliflozin   Take 25 mg by mouth every evening.  Dose: 25 mg     Lipitor 20 MG Tabs  Generic drug: atorvastatin   Take 20 mg by mouth every evening.  Dose: 20 mg     lisinopril 5 MG Tabs  Commonly known as: Prinivil   Take 5 mg by mouth every day.  Dose: 5 mg     omeprazole 20 MG delayed-release capsule  Commonly known as: PriLOSEC   Take 20 mg by mouth every evening.  Dose: 20 mg     traZODone 50 MG Tabs  Commonly known as: Desyrel   Take 50 mg by mouth at bedtime.  Dose: 50 mg            STOP taking these medications      meloxicam 15 MG tablet  Commonly known as: Mobic              Allergies  Allergies[1]    DIET  Orders Placed This Encounter   Procedures    Diet Order Diet: Consistent CHO (Diabetic)     Standing Status:   Standing     Number of Occurrences:   1     Diet::   Consistent CHO (Diabetic) [4]       ACTIVITY  As tolerated.  Weight bearing as tolerated    CONSULTATIONS  Ortho    PROCEDURES  DATE OF OPERATION: 6/9/2025     PREOPERATIVE DIAGNOSIS:  Left nondisplaced intertrochanteric femur fracture     POSTOPERATIVE DIAGNOSIS: Same     PROCEDURE PERFORMED:   Open reduction internal fixation left intertrochanteric femur fracture with cephalomedullary implant     SURGEON: Yair Neely M.D.     LABORATORY  Lab Results   Component Value Date    SODIUM 135 06/11/2025    POTASSIUM 4.2 06/11/2025    CHLORIDE 104 06/11/2025    CO2 23 06/11/2025    GLUCOSE 147 (H) 06/11/2025    BUN 22 06/11/2025    CREATININE 0.89 06/11/2025    CREATININE 1.0 02/24/2009        Lab  Results   Component Value Date    WBC 10.2 06/09/2025    HEMOGLOBIN 11.2 (L) 06/09/2025    HEMATOCRIT 35.0 (L) 06/09/2025    PLATELETCT 187 06/09/2025        Total time of the discharge process exceeds 32 minutes.       [1] No Known Allergies

## 2025-06-11 NOTE — CARE PLAN
The patient is Stable - Low risk of patient condition declining or worsening    Shift Goals  Clinical Goals: pain control, safety,mobility  Patient Goals: pain control, mobility  Family Goals: NA    Progress made toward(s) clinical / shift goals:  yes  Problem: Pain - Standard  Goal: Alleviation of pain or a reduction in pain to the patient’s comfort goal  6/11/2025 0334 by Shana Ibarra RValenciaN.  Outcome: Progressing  6/11/2025 0334 by Shana Ibarra RValenciaN.  Outcome: Progressing     Problem: Mobility  Goal: Patient's capacity to carry out activities will improve  6/11/2025 0334 by Shana Ibarra R.N.  Outcome: Progressing  6/11/2025 0334 by Shana Ibarra R.N.  Outcome: Progressing     Problem: Self Care  Goal: Patient will have the ability to perform ADLs independently or with assistance (bathe, groom, dress, toilet and feed)  Outcome: Progressing     Problem: Wound/ / Incision Healing  Goal: Patient's wound/surgical incision will decrease in size and heals properly  Outcome: Progressing       Patient is not progressing towards the following goals:

## 2025-06-11 NOTE — THERAPY
Physical Therapy   Daily Treatment     Patient Name:  Kristen Collado  Age:  85 y.o., Sex:  female  Medical Record #:  [id number]  Today's Date: 6/11/2025     Precautions  Medical: Fall Risk  Weight Bearing: Weight Bearing as Tolerated Left Lower Extremity    Assessment    Pt was agreeable to PT session.  She is reporting more pain than yesterday adnd required increased time and multiple attempts to complete mobility tasks.  Pt was able to complete 3 steps SBA with BUE support.  Ambulation was limited by pain to 20' x2.  She is most limited by pain.  PT will continue to follow. Recommend home health PT services.    Plan    Treatment Plan Status: (P) Continue Current Treatment Plan  Type of Treatment: Bed Mobility, Equipment, Family / Caregiver Training, Gait Training, Manual Therapy, Neuro Re-Education / Balance, Self Care / Home Evaluation, Stair Training, Therapeutic Activities, Therapeutic Exercise  Treatment Frequency: 5 Times per Week  Treatment Duration: Until Therapy Goals Met    DC Equipment Recommendations: (P) None (Pt has a standard walker at home)  Discharge Recommendations: (P) Recommend home health for continued physical therapy services         Objective       06/11/25 1046   Precautions   Medical Fall Risk   Weight Bearing Weight Bearing as Tolerated Left Lower Extremity   Vitals   Pulse Oximetry 90 %   O2 Delivery Device None - Room Air   Pain 0 - 10 Group   Therapist Pain Assessment During Activity;7  (L hip)   Cognition    Cognition / Consciousness WDL   Level of Consciousness Alert   Balance   Sitting Balance (Static) Fair +   Sitting Balance (Dynamic) Fair   Standing Balance (Static) Fair   Standing Balance (Dynamic) Fair   Weight Shift Sitting Fair   Weight Shift Standing Fair   Comments with FWW   Bed Mobility    Supine to Sit Standby Assist   Scooting Standby Assist   Rolling Standby Assist   Comments HOB elevated, rail  (sleeps in a recliner)   Gait Analysis   Gait Level Of Assist Standby  Assist   Assistive Device Front Wheel Walker   Distance (Feet) 20   # of Times Distance was Traveled 2   Deviation Antalgic;Step To;Bradykinetic   # of Stairs Climbed 3  (step to, B rails)   Level of Assist with Stairs Standby Assist   Weight Bearing Status WBAT LLE   Skilled Intervention Sequencing;Verbal Cuing   Functional Mobility   Sit to Stand Standby Assist   Bed, Chair, Wheelchair Transfer Standby Assist   Transfer Method Stand Step   Mobility with FWW   Skilled Intervention Sequencing;Verbal Cuing   Activity Tolerance   Sitting in Chair post session   Short Term Goals    Short Term Goal # 1 Pt will perform supine < > sit SPV with bed flat, no rail in 6 visits in order to set up for upright mobility   Goal Outcome # 1 goal not met   Short Term Goal # 2 Pt will perform sit < > stand SPV in 6 visits in order to prepare for ambulation   Goal Outcome # 2 Goal not met   Short Term Goal # 3 Pt will ambulate 150' SPV /c FWW in 6 visits in order to return home safely   Goal Outcome # 3 Goal not met   Short Term Goal # 4 Pt will ascend/ descend 2 steps SPV in 6 visits in order to access her home   Goal Outcome # 4 Goal not met   Education Group   Education Provided Gait Training;Stair Training;Transfer Status   Gait Training Patient Response Patient;Acceptance;Explanation;Action Demonstration;Reinforcement Needed   Stair Training Patient Response Patient;Acceptance;Explanation;Action Demonstration;Reinforcement Needed   Transfer Status Patient Response Patient;Acceptance;Explanation;Action Demonstration;Reinforcement Needed   Additional Comments bed mobility- Patient;Acceptance;Explanation;Action Demonstration;Reinforcement Needed;   Physical Therapy Treatment Plan   Physical Therapy Treatment Plan Continue Current Treatment Plan   Anticipated Discharge Equipment and Recommendations   DC Equipment Recommendations None  (Pt has a standard walker at home)   Discharge Recommendations Recommend home health for continued  physical therapy services   Interdisciplinary Plan of Care Collaboration   IDT Collaboration with  Nursing   Patient Position at End of Therapy Seated;Call Light within Reach;Tray Table within Reach   Collaboration Comments RN updated   Session Information   Date / Session Number  6/11 -1 (2/5, 6/16)   Priority   (hip fx)

## 2025-06-11 NOTE — CARE PLAN
The patient is Stable - Low risk of patient condition declining or worsening    Shift Goals  Clinical Goals: pain control, discharge, mobility  Patient Goals: dicharge  Family Goals: not present    Progress made toward(s) clinical / shift goals:    Problem: Mobility  Goal: Patient's capacity to carry out activities will improve  Outcome: Progressing  Flowsheets  Taken 6/11/2025 0846  Mobility:   Encouraged mobilization per interdisciplinary team recommendations   Monitored for signs of activity intolerance   Provided assistive devices   Provided rest periods between activities   Administered pain management to allow progressive mobilization   Collaborated with PT/OT  Taken 6/11/2025 0778  Level of Mobility: Level IV  Activity Performed: Up to bathroom  Time Activity Tolerated: 10 min  Distance Per Occurrence (ft.): 10 feet  # of Times Distance was Traveled: 2  Assistance: Assistance of One     Problem: Self Care  Goal: Patient will have the ability to perform ADLs independently or with assistance (bathe, groom, dress, toilet and feed)  Outcome: Progressing     Problem: Infection - Standard  Goal: Patient will remain free from infection  Outcome: Progressing  Flowsheets (Taken 6/11/2025 0846)  Standard Infection Interventions:   Assessed for signs and symptoms of infection   Implemented standard precautions   Instructed patient/family on signs and symptoms of infection   Provided education on proper hand hygiene and infection prevention measures   Assessed for removal IV, central lines, intra-arterial or urinary catheters     Problem: Wound/ / Incision Healing  Goal: Patient's wound/surgical incision will decrease in size and heals properly  Outcome: Progressing

## 2025-06-11 NOTE — DISCHARGE PLANNING
Case Management Discharge Planning    Admission Date: 6/8/2025  GMLOS: 3.1  ALOS: 3    6-Clicks ADL Score: 16  6-Clicks Mobility Score: 18  PT and/or OT Eval ordered: Yes  Post-acute Referrals Ordered: Yes  Post-acute Choice Obtained: Yes  Has referral(s) been sent to post-acute provider:  Yes      Anticipated Discharge Dispo: Discharge Disposition: D/T to home under [facility] care in anticipation of covered skilled care (06)    DME Needed: No    Action(s) Taken: Patient was discussed in morning rounds.  Per MD, patient needs to be weaned from oxygen and then plan to [facility] home with [facility].  Referral is pending with [facility].  Patient's neighbor can pick her up this afternoon to transport home.  IMM signed.      1022  Patient was accepted by [facility].  RNCM notified bedside RN and MD.  Contact information added to AVS.      Escalations Completed: None    Medically Clear: Yes    Next Steps: RN CM to continue to follow for DC planning      Barriers to Discharge: Wean O2

## 2025-06-11 NOTE — DISCHARGE INSTRUCTIONS
Follow up with Dr. Neely 2 weeks following final surgery for repeat imaging and wound check. (Est follow up date 6/23). Change dressings every other day and as needed for saturation. Dress with gauze and transparent film.  Please take a baby aspirin 81 mg twice a day for 4 weeks to prevent blood clots while your leg is healing, this was recommended by the orthopedic surgeon.

## (undated) DEVICE — DRAPE 36X28IN RAD CARM BND BG - (25/CA)

## (undated) DEVICE — SUTURE 2-0 VICRYL PLUS CT-1 - 8 X 18 INCH(12/BX)

## (undated) DEVICE — GOWN WARMING STANDARD FLEX - (30/CA)

## (undated) DEVICE — SET EXTENSION WITH 2 PORTS (48EA/CA) ***PART #2C8610 IS A SUBSTITUTE*****

## (undated) DEVICE — DRAPE LOWER EXTREMETY - (6/CA)

## (undated) DEVICE — GLOVE BIOGEL PI INDICATOR SZ 8.0 SURGICAL PF LF -(50/BX 4BX/CA)

## (undated) DEVICE — GOWN SURGICAL X-LARGE ULTRA - FILM-REINFORCED (20/CA)

## (undated) DEVICE — BLADE SURGICAL #10 - (50/BX)

## (undated) DEVICE — SODIUM CHL IRRIGATION 0.9% 1000ML (12EA/CA)

## (undated) DEVICE — LOCKING DRILL 4.2X360MM

## (undated) DEVICE — SUTURE GENERAL

## (undated) DEVICE — SUCTION INSTRUMENT YANKAUER BULBOUS TIP W/O VENT (50EA/CA)

## (undated) DEVICE — DRESSING TRANSPARENT FILM TEGADERM 4 X 4.75" (50EA/BX)"

## (undated) DEVICE — PACK MAJOR ORTHO TRAUMA- (3EA/CA)

## (undated) DEVICE — SENSOR OXIMETER ADULT SPO2 RD SET (20EA/BX)

## (undated) DEVICE — TUBING CLEARLINK DUO-VENT - C-FLO (48EA/CA)

## (undated) DEVICE — LACTATED RINGERS INJ 1000 ML - (14EA/CA 60CA/PF)

## (undated) DEVICE — BAG SPONGE COUNT 10.25 X 32 - BLUE (250/CA)

## (undated) DEVICE — CANISTER SUCTION 3000ML MECHANICAL FILTER AUTO SHUTOFF MEDI-VAC NONSTERILE LF DISP (40EA/CA)

## (undated) DEVICE — GLOVE BIOGEL PI ORTHO SZ 7.5 PF LF (40PR/BX)

## (undated) DEVICE — DRAPE IOBAN LARGE 2 INCISE FILM (5EA/CA)